# Patient Record
Sex: MALE | Race: WHITE | HISPANIC OR LATINO | Employment: OTHER | URBAN - METROPOLITAN AREA
[De-identification: names, ages, dates, MRNs, and addresses within clinical notes are randomized per-mention and may not be internally consistent; named-entity substitution may affect disease eponyms.]

---

## 2018-12-18 ENCOUNTER — OFFICE VISIT (OUTPATIENT)
Dept: URGENT CARE | Facility: CLINIC | Age: 33
End: 2018-12-18
Payer: MEDICAID

## 2018-12-18 VITALS
BODY MASS INDEX: 34.1 KG/M2 | WEIGHT: 225 LBS | HEART RATE: 94 BPM | SYSTOLIC BLOOD PRESSURE: 138 MMHG | DIASTOLIC BLOOD PRESSURE: 74 MMHG | HEIGHT: 68 IN | TEMPERATURE: 99 F | OXYGEN SATURATION: 97 % | RESPIRATION RATE: 18 BRPM

## 2018-12-18 DIAGNOSIS — J40 BRONCHITIS: ICD-10-CM

## 2018-12-18 DIAGNOSIS — M79.10 MYALGIA: Primary | ICD-10-CM

## 2018-12-18 LAB
CTP QC/QA: YES
FLUAV AG NPH QL: NEGATIVE
FLUBV AG NPH QL: NEGATIVE

## 2018-12-18 PROCEDURE — 87804 INFLUENZA ASSAY W/OPTIC: CPT | Mod: 59,QW,S$GLB, | Performed by: FAMILY MEDICINE

## 2018-12-18 PROCEDURE — 99214 OFFICE O/P EST MOD 30 MIN: CPT | Mod: 25,S$GLB,, | Performed by: FAMILY MEDICINE

## 2018-12-18 RX ORDER — DEXAMETHASONE SODIUM PHOSPHATE 100 MG/10ML
10 INJECTION INTRAMUSCULAR; INTRAVENOUS
Status: COMPLETED | OUTPATIENT
Start: 2018-12-18 | End: 2018-12-18

## 2018-12-18 RX ORDER — AZITHROMYCIN 250 MG/1
TABLET, FILM COATED ORAL
Qty: 6 TABLET | Refills: 0 | Status: SHIPPED | OUTPATIENT
Start: 2018-12-18

## 2018-12-18 RX ADMIN — DEXAMETHASONE SODIUM PHOSPHATE 10 MG: 100 INJECTION INTRAMUSCULAR; INTRAVENOUS at 04:12

## 2018-12-18 NOTE — PATIENT INSTRUCTIONS
What Is Acute Bronchitis?  Acute bronchitis is when the airways in your lungs (bronchial tubes) become red and swollen (inflamed). It is usually caused by a viral infection. But it can also occur because of a bacteria or allergen. Symptoms include a cough that produces yellow or greenish mucus and can last for days or sometimes weeks.  Inside healthy lungs    Air travels in and out of the lungs through the airways. The linings of these airways produce sticky mucus. This mucus traps particles that enter the lungs. Tiny structures called cilia then sweep the particles out of the airways.     Healthy airway: Airways are normally open. Air moves in and out easily.      Healthy cilia: Tiny, hairlike cilia sweep mucus and particles up and out of the airways.   Lungs with bronchitis  Bronchitis often occurs with a cold or the flu virus. The airways become inflamed (red and swollen). There is a deep hacking cough from the extra mucus. Other symptoms may include:  · Wheezing  · Chest discomfort  · Shortness of breath  · Mild fever  A second infection, this time due to bacteria, may then occur. And airways irritated by allergens or smoke are more likely to get infected.        Inflamed airway: Inflammation and extra mucus narrow the airway, causing shortness of breath.      Impaired cilia: Extra mucus impairs cilia, causing congestion and wheezing. Smoking makes the problem worse.   Making a diagnosis  A physical exam, health history, and certain tests help your healthcare provider make the diagnosis.  Health history  Your healthcare provider will ask you about your symptoms.  The exam  Your provider listens to your chest for signs of congestion. He or she may also check your ears, nose, and throat.  Possible tests  · A sputum test for bacteria. This requires a sample of mucus from your lungs.  · A nasal or throat swab. This tests to see if you have a bacterial infection.  · A chest X-ray. This is done if your healthcare  provider thinks you have pneumonia.  · Tests to check for an underlying condition. Other tests may be done to check for things such as allergies, asthma, or COPD (chronic obstructive pulmonary disease). You may need to see a specialist for more lung function testing.  Treating a cough  The main treatment for bronchitis is easing symptoms. Avoiding smoke, allergens, and other things that trigger coughing can often help. If the infection is bacterial, you may be given antibiotics. During the illness, it's important to get plenty of sleep. To ease symptoms:  · Dont smoke. Also avoid secondhand smoke.  · Use a humidifier. Or try breathing in steam from a hot shower. This may help loosen mucus.  · Drink a lot of water and juice. They can soothe the throat and may help thin mucus.  · Sit up or use extra pillows when in bed. This helps to lessen coughing and congestion.  · Ask your provider about using medicine. Ask about using cough medicine, pain and fever medicine, or a decongestant.  Antibiotics  Most cases of bronchitis are caused by cold or flu viruses. They dont need antibiotics to treat them, even if your mucus is thick and green or yellow. Antibiotics dont treat viral illness and antibiotics have not been shown to have any benefit in cases of acute bronchitis. Taking antibiotics when they are not needed increases your risk of getting an infection later that is antibiotic-resistant. Antibiotics can also cause severe cases of diarrhea that require other antibiotics to treat.  It is important that you accept your healthcare provider's opinion to not use antibiotics. Your provider will prescribe antibiotics if the infection is caused by bacteria. If they are prescribed:  · Take all of the medicine. Take the medicine until it is used up, even if symptoms have improved. If you dont, the bronchitis may come back.  · Take the medicines as directed. For instance, some medicines should be taken with food.  · Ask about  side effects. Ask your provider or pharmacist what side effects are common, and what to do about them.  Follow-up care  You should see your provider again in 2 to 3 weeks. By this time, symptoms should have improved. An infection that lasts longer may mean you have a more serious problem.  Prevention  · Avoid tobacco smoke. If you smoke, quit. Stay away from smoky places. Ask friends and family not to smoke around you, or in your home or car.  · Get checked for allergies.  · Ask your provider about getting a yearly flu shot. Also ask about pneumococcal or pneumonia shots.  · Wash your hands often. This helps reduce the chance of picking up viruses that cause colds and flu.  Call your healthcare provider if:  · Symptoms worsen, or you have new symptoms  · Breathing problems worsen or  become severe  · Symptoms dont get better within a week, or within 3 days of taking antibiotics   Date Last Reviewed: 2/1/2017  © 9537-7726 [employer]. [address] [place] [address], [place] [address]. All rights reserved. This information is not intended as a substitute for professional medical care. Always follow your healthcare professional's instructions.

## 2018-12-18 NOTE — PROGRESS NOTES
"Subjective:       Patient ID: Yair Flynn is a 33 y.o. male.    Vitals:  height is 5' 8" (1.727 m) and weight is 102.1 kg (225 lb). His oral temperature is 99 °F (37.2 °C). His blood pressure is 138/74 and his pulse is 94. His respiration is 18 and oxygen saturation is 97%.     Chief Complaint: Cough    Cough for 1 week.no fever or chills,  No hx of asthma.Pt states had fever and chills and achyness fever upto 103 last week, did not seek attention, all family members were ill      Cough   This is a new problem. The current episode started in the past 7 days. The problem has been unchanged. The cough is productive of sputum. Associated symptoms include chills, myalgias, nasal congestion and postnasal drip. Pertinent negatives include no ear pain, eye redness, fever, hemoptysis, rash, sore throat, shortness of breath or wheezing. Nothing aggravates the symptoms. He has tried OTC cough suppressant for the symptoms. The treatment provided mild relief.       Constitution: Positive for chills. Negative for sweating, fatigue and fever.   HENT: Positive for postnasal drip, sinus pain and sinus pressure. Negative for ear pain, congestion, sore throat and voice change.    Neck: Negative for painful lymph nodes.   Eyes: Negative for eye redness.   Respiratory: Positive for cough. Negative for chest tightness, sputum production, bloody sputum, COPD, shortness of breath, stridor, wheezing and asthma.    Gastrointestinal: Negative for nausea and vomiting.   Musculoskeletal: Positive for muscle ache.   Skin: Negative for rash.   Allergic/Immunologic: Negative for seasonal allergies and asthma.   Hematologic/Lymphatic: Negative for swollen lymph nodes.       Objective:      Physical Exam   Constitutional: He is oriented to person, place, and time. He appears well-developed and well-nourished. He is cooperative.  Non-toxic appearance. He does not appear ill. No distress.   HENT:   Head: Normocephalic and atraumatic.   Right Ear: " Hearing, tympanic membrane, external ear and ear canal normal.   Left Ear: Hearing, tympanic membrane, external ear and ear canal normal.   Nose: Nose normal. No mucosal edema, rhinorrhea or nasal deformity. No epistaxis. Right sinus exhibits no maxillary sinus tenderness and no frontal sinus tenderness. Left sinus exhibits no maxillary sinus tenderness and no frontal sinus tenderness.   Mouth/Throat: Uvula is midline, oropharynx is clear and moist and mucous membranes are normal. No trismus in the jaw. Normal dentition. No uvula swelling. No oropharyngeal exudate or posterior oropharyngeal erythema.   Eyes: Conjunctivae and lids are normal. Right eye exhibits no discharge. Left eye exhibits no discharge. No scleral icterus.   Sclera clear bilat   Neck: Trachea normal, normal range of motion, full passive range of motion without pain and phonation normal. Neck supple. No JVD present.   Cardiovascular: Normal rate, regular rhythm, normal heart sounds, intact distal pulses and normal pulses. Exam reveals no gallop and no friction rub.   No murmur heard.  Pulmonary/Chest: Effort normal and breath sounds normal. No stridor. He has no wheezes.   Minimal rhonchi, no crackels   Abdominal: Soft. Normal appearance and bowel sounds are normal. He exhibits no distension, no pulsatile midline mass and no mass. There is no tenderness.   Musculoskeletal: Normal range of motion. He exhibits no edema or deformity.   Lymphadenopathy:     He has no cervical adenopathy.   Neurological: He is alert and oriented to person, place, and time. He exhibits normal muscle tone. Coordination normal.   Skin: Skin is warm, dry and intact. He is not diaphoretic. No pallor.   Psychiatric: He has a normal mood and affect. His speech is normal and behavior is normal. Judgment and thought content normal. Cognition and memory are normal.   Nursing note and vitals reviewed.      Assessment:       1. Myalgia    2. Bronchitis        Plan:          Myalgia  -     POCT Influenza A/B  -     dexamethasone injection 10 mg    Bronchitis  -     azithromycin (ZITHROMAX Z-ROSEMARY) 250 MG tablet; Take 2 tablets (500 mg) on  Day 1,  followed by 1 tablet (250 mg) once daily on Days 2 through 5.  Dispense: 6 tablet; Refill: 0  -     dexamethasone injection 10 mg

## 2018-12-21 ENCOUNTER — OFFICE VISIT (OUTPATIENT)
Dept: URGENT CARE | Facility: CLINIC | Age: 33
End: 2018-12-21
Payer: MEDICAID

## 2018-12-21 VITALS
WEIGHT: 225 LBS | OXYGEN SATURATION: 98 % | HEART RATE: 79 BPM | RESPIRATION RATE: 18 BRPM | SYSTOLIC BLOOD PRESSURE: 114 MMHG | HEIGHT: 68 IN | DIASTOLIC BLOOD PRESSURE: 62 MMHG | TEMPERATURE: 98 F | BODY MASS INDEX: 34.1 KG/M2

## 2018-12-21 DIAGNOSIS — R20.2 INTERMITTENT PARESTHESIA OF RIGHT HAND AND FOOT: ICD-10-CM

## 2018-12-21 DIAGNOSIS — J40 BRONCHITIS: Primary | ICD-10-CM

## 2018-12-21 PROCEDURE — 99214 OFFICE O/P EST MOD 30 MIN: CPT | Mod: S$GLB,,, | Performed by: PHYSICIAN ASSISTANT

## 2018-12-21 PROCEDURE — 71046 X-RAY EXAM CHEST 2 VIEWS: CPT | Mod: FY,S$GLB,, | Performed by: RADIOLOGY

## 2018-12-21 RX ORDER — PROMETHAZINE HYDROCHLORIDE AND DEXTROMETHORPHAN HYDROBROMIDE 6.25; 15 MG/5ML; MG/5ML
5 SYRUP ORAL NIGHTLY PRN
Qty: 60 ML | Refills: 0 | Status: SHIPPED | OUTPATIENT
Start: 2018-12-21

## 2018-12-21 RX ORDER — BENZONATATE 100 MG/1
100 CAPSULE ORAL 3 TIMES DAILY PRN
Qty: 30 CAPSULE | Refills: 0 | Status: SHIPPED | OUTPATIENT
Start: 2018-12-21 | End: 2019-12-21

## 2018-12-21 RX ORDER — DOXYCYCLINE 100 MG/1
100 CAPSULE ORAL 2 TIMES DAILY
Qty: 14 CAPSULE | Refills: 0 | Status: SHIPPED | OUTPATIENT
Start: 2018-12-21 | End: 2018-12-28

## 2018-12-22 NOTE — PROGRESS NOTES
"Subjective:       Patient ID: Yair Flynn is a 33 y.o. male.    Vitals:  height is 5' 8" (1.727 m) and weight is 102.1 kg (225 lb). His temperature is 98.4 °F (36.9 °C). His blood pressure is 114/62 and his pulse is 79. His respiration is 18 and oxygen saturation is 98%.     Chief Complaint: Cough    Pt complains of ear pain and unable to hear at all from his left ear and cough for the past 4 days. He also complains of a tingling sensation in his right hand and right foot for the past day. He denies numbness. He denies weakness.       Cough   This is a recurrent problem. The current episode started in the past 7 days. The problem has been unchanged. The problem occurs constantly. The cough is productive of sputum. Associated symptoms include ear pain. Pertinent negatives include no chills, eye redness, fever, hemoptysis, myalgias, rash, sore throat, shortness of breath or wheezing. Nothing aggravates the symptoms. He has tried OTC cough suppressant (antibotics) for the symptoms. The treatment provided mild relief.       Constitution: Negative for chills, sweating, fatigue and fever.   HENT: Positive for ear pain, congestion, sinus pain and sinus pressure. Negative for sore throat and voice change.    Neck: Negative for painful lymph nodes.   Eyes: Negative for eye redness.   Respiratory: Positive for cough. Negative for chest tightness, sputum production, bloody sputum, COPD, shortness of breath, stridor, wheezing and asthma.    Gastrointestinal: Negative for nausea and vomiting.   Musculoskeletal: Negative for muscle ache.   Skin: Negative for rash and erythema.   Allergic/Immunologic: Negative for seasonal allergies and asthma.   Hematologic/Lymphatic: Negative for swollen lymph nodes.       Objective:      Physical Exam   Constitutional: He is oriented to person, place, and time. Vital signs are normal. He appears well-developed and well-nourished. He is cooperative. He does not appear ill. No distress.   HENT: "   Head: Normocephalic and atraumatic.   Right Ear: Hearing, tympanic membrane, external ear and ear canal normal.   Left Ear: Hearing, tympanic membrane, external ear and ear canal normal.   Nose: Mucosal edema present. Right sinus exhibits no maxillary sinus tenderness and no frontal sinus tenderness. Left sinus exhibits no maxillary sinus tenderness and no frontal sinus tenderness.   Mouth/Throat: Uvula is midline and oropharynx is clear and moist. No oropharyngeal exudate, posterior oropharyngeal edema or posterior oropharyngeal erythema.   Eyes: Conjunctivae, EOM and lids are normal. Right eye exhibits no discharge. Left eye exhibits no discharge.   Neck: Normal range of motion. Neck supple.   Cardiovascular: Normal rate, regular rhythm and normal heart sounds. Exam reveals no gallop and no friction rub.   No murmur heard.  Pulmonary/Chest: Effort normal. No stridor. No respiratory distress. He has no decreased breath sounds. He has wheezes (diffuse). He has no rhonchi. He has no rales.   Musculoskeletal: Normal range of motion.   Lymphadenopathy:        Head (right side): No submandibular and no tonsillar adenopathy present.        Head (left side): No submandibular and no tonsillar adenopathy present.   Neurological: He is alert and oriented to person, place, and time. He has normal strength. He is not disoriented. No sensory deficit. He displays a negative Romberg sign. GCS eye subscore is 4. GCS verbal subscore is 5. GCS motor subscore is 6.   Symmetrical facial expressions; normal finger to nose test; normal heel to shin test; normal rapid alternating hand movements   Skin: Skin is warm and dry. No bruising and no rash noted. He is not diaphoretic. No erythema. No pallor.   Psychiatric: He has a normal mood and affect. His speech is normal and behavior is normal.   Nursing note and vitals reviewed.      Assessment:       1. Bronchitis    2. Intermittent paresthesia of right hand and foot        Xr Chest Pa  And Lateral    Result Date: 12/21/2018  EXAMINATION: XR CHEST PA AND LATERAL CLINICAL HISTORY: Cough TECHNIQUE: PA and lateral views of the chest were performed. COMPARISON: None FINDINGS: Cardiac silhouette is normal in size.  Lungs are symmetrically expanded.  No evidence of focal consolidative process, pneumothorax, or significant effusion.  No acute osseous abnormality identified.     No acute intrathoracic process identified. Electronically signed by: Victorino Cortez MD Date:    12/21/2018 Time:    20:42    Plan:       Strongly advised patient to report to the emergency room if the tingling sensation worsens. Pt states he believes he may just be tired. Treating bronchitis. Pt shows no signs of stroke. Vitals stable. No significant medical history.     Bronchitis  -     XR CHEST PA AND LATERAL; Future; Expected date: 12/21/2018  -     doxycycline (VIBRAMYCIN) 100 MG Cap; Take 1 capsule (100 mg total) by mouth 2 (two) times daily. for 7 days  Dispense: 14 capsule; Refill: 0  -     benzonatate (TESSALON PERLES) 100 MG capsule; Take 1 capsule (100 mg total) by mouth 3 (three) times daily as needed for Cough.  Dispense: 30 capsule; Refill: 0  -     promethazine-dextromethorphan (PROMETHAZINE-DM) 6.25-15 mg/5 mL Syrp; Take 5 mLs by mouth nightly as needed.  Dispense: 60 mL; Refill: 0    Intermittent paresthesia of right hand and foot      Patient Instructions   PLEASE REPORT DIRECTLY TO THE EMERGENCY ROOM IF NUMBNESS/TINGLING SENSATIONS DO NOT IMPROVE OR WORSEN FOR FURTHER EVALUATION AS DISCUSSED.    -Please take antibiotic to completion.  -Take tessalon perles during the day and cough syrup at night as needed. Be aware the cough syrup may cause drowsiness.    Below are suggestions for symptomatic relief:   -Tylenol every 4 hours OR ibuprofen every 6 hours as needed for pain/fever.   -Salt water gargles to soothe throat pain.   -Chloroseptic spray also helps to numb throat pain.   -Nasal saline spray reduces  inflammation and dryness.   -Warm face compresses to help with facial sinus pain/pressure.   -Vicks vapor rub at night.   -Flonase OTC or Nasacort OTC for nasal congestion.   -Simple foods like chicken noodle soup.   -Delsym helps with coughing at night   -Zyrtec/Claritin during the day & Benadryl at night may help with allergies.     If you DO NOT have Hypertension or any history of palpitations, it is ok to take over the counter Sudafed or Mucinex D or Allegra-D or Claritin-D or Zyrtec-D.  If you do take one of the above, it is ok to combine that with plain over the counter Mucinex or Allegra or Claritin or Zyrtec. If, for example, you are taking Zyrtec -D, you can combine that with Mucinex, but not Mucinex-D.  If you are taking Mucinex-D, you can combine that with plain Allegra or Claritin or Zyrtec.   If you DO have Hypertension or palpitations, it is safe to take Coricidin HBP for relief of sinus symptoms.    Please follow up with your primary care provider within 2-5 days if your signs and symptoms have not resolved or worsen.     If your condition worsens or fails to improve we recommend that you receive another evaluation at the emergency room immediately or contact your primary medical clinic to discuss your concerns.   You must understand that you have received an Urgent Care treatment only and that you may be released before all of your medical problems are known or treated. You, the patient, will arrange for follow up care as instructed.

## 2018-12-22 NOTE — PATIENT INSTRUCTIONS
PLEASE REPORT DIRECTLY TO THE EMERGENCY ROOM IF NUMBNESS/TINGLING SENSATIONS DO NOT IMPROVE OR WORSEN FOR FURTHER EVALUATION AS DISCUSSED.    -Please take antibiotic to completion.  -Take tessalon perles during the day and cough syrup at night as needed. Be aware the cough syrup may cause drowsiness.    Below are suggestions for symptomatic relief:   -Tylenol every 4 hours OR ibuprofen every 6 hours as needed for pain/fever.   -Salt water gargles to soothe throat pain.   -Chloroseptic spray also helps to numb throat pain.   -Nasal saline spray reduces inflammation and dryness.   -Warm face compresses to help with facial sinus pain/pressure.   -Vicks vapor rub at night.   -Flonase OTC or Nasacort OTC for nasal congestion.   -Simple foods like chicken noodle soup.   -Delsym helps with coughing at night   -Zyrtec/Claritin during the day & Benadryl at night may help with allergies.     If you DO NOT have Hypertension or any history of palpitations, it is ok to take over the counter Sudafed or Mucinex D or Allegra-D or Claritin-D or Zyrtec-D.  If you do take one of the above, it is ok to combine that with plain over the counter Mucinex or Allegra or Claritin or Zyrtec. If, for example, you are taking Zyrtec -D, you can combine that with Mucinex, but not Mucinex-D.  If you are taking Mucinex-D, you can combine that with plain Allegra or Claritin or Zyrtec.   If you DO have Hypertension or palpitations, it is safe to take Coricidin HBP for relief of sinus symptoms.    Please follow up with your primary care provider within 2-5 days if your signs and symptoms have not resolved or worsen.     If your condition worsens or fails to improve we recommend that you receive another evaluation at the emergency room immediately or contact your primary medical clinic to discuss your concerns.   You must understand that you have received an Urgent Care treatment only and that you may be released before all of your medical problems are  known or treated. You, the patient, will arrange for follow up care as instructed.

## 2020-09-29 ENCOUNTER — OFFICE VISIT (OUTPATIENT)
Dept: URGENT CARE | Facility: CLINIC | Age: 35
End: 2020-09-29
Payer: MEDICAID

## 2020-09-29 VITALS
HEART RATE: 67 BPM | SYSTOLIC BLOOD PRESSURE: 105 MMHG | BODY MASS INDEX: 31.83 KG/M2 | OXYGEN SATURATION: 98 % | DIASTOLIC BLOOD PRESSURE: 73 MMHG | WEIGHT: 210 LBS | HEIGHT: 68 IN | TEMPERATURE: 99 F

## 2020-09-29 DIAGNOSIS — S39.012A LOW BACK STRAIN, INITIAL ENCOUNTER: Primary | ICD-10-CM

## 2020-09-29 DIAGNOSIS — M62.830 BACK MUSCLE SPASM: ICD-10-CM

## 2020-09-29 PROCEDURE — 99213 OFFICE O/P EST LOW 20 MIN: CPT | Mod: S$GLB,,, | Performed by: PHYSICIAN ASSISTANT

## 2020-09-29 PROCEDURE — 99213 PR OFFICE/OUTPT VISIT, EST, LEVL III, 20-29 MIN: ICD-10-PCS | Mod: S$GLB,,, | Performed by: PHYSICIAN ASSISTANT

## 2020-09-29 RX ORDER — KETOROLAC TROMETHAMINE 30 MG/ML
60 INJECTION, SOLUTION INTRAMUSCULAR; INTRAVENOUS
Status: DISCONTINUED | OUTPATIENT
Start: 2020-09-29 | End: 2020-09-29

## 2020-09-29 RX ORDER — CYCLOBENZAPRINE HCL 5 MG
5 TABLET ORAL 3 TIMES DAILY PRN
Qty: 21 TABLET | Refills: 0 | Status: SHIPPED | OUTPATIENT
Start: 2020-09-29 | End: 2020-10-06

## 2020-09-29 RX ORDER — KETOROLAC TROMETHAMINE 30 MG/ML
30 INJECTION, SOLUTION INTRAMUSCULAR; INTRAVENOUS
Status: COMPLETED | OUTPATIENT
Start: 2020-09-29 | End: 2020-09-29

## 2020-09-29 RX ORDER — DICLOFENAC SODIUM 50 MG/1
50 TABLET, DELAYED RELEASE ORAL 2 TIMES DAILY PRN
Qty: 14 TABLET | Refills: 0 | Status: SHIPPED | OUTPATIENT
Start: 2020-09-29 | End: 2020-10-06

## 2020-09-29 RX ADMIN — KETOROLAC TROMETHAMINE 30 MG: 30 INJECTION, SOLUTION INTRAMUSCULAR; INTRAVENOUS at 08:09

## 2020-09-29 NOTE — LETTER
September 29, 2020      [facility] [place] [address]  Phone: [phone]  Fax: [phone]       Patient: Yair Flynn   YOB: 1985  Date of Visit: 09/29/2020    To Whom It May Concern:    Ladi Flynn  was at [facility] on 09/29/2020. He may return to work/school on 10/01/2020 with no restrictions. If you have any questions or concerns, or if I can be of further assistance, please do not hesitate to contact me.    Sincerely,    Dariela Jackson MA

## 2020-09-30 NOTE — PATIENT INSTRUCTIONS
PLEASE READ YOUR DISCHARGE INSTRUCTIONS ENTIRELY AS IT CONTAINS IMPORTANT INFORMATION.  You received an injection of a powerful NSAID today (Toradol).  Its effects will last up to 24 hours.  Please do not take another NSAID (ie aspirin, ibuprofen, Aleve, Advil or Motrin) until this time tomorrow.  If you continue to have pain, you may take Tylenol (acetaminophen) if you are not allergic to this medication.  Do not start the Voltaren until 10pm tomorrow.   - Rest.    - Drink plenty of fluids.    - Tylenol as directed as needed for fever/pain.    - If you were prescribed antibiotics, please take them to completion.  - If you are female and on birth control pills - please use additional methods of contraception to prevent pregnancy while on antibiotics and for one cycle after.   - If you were prescribed a narcotic medication or muscle relaxer, do not drive or operate heavy equipment or machinery while taking these medications, as they can cause drowsiness.   - If you smoke, please stop smoking.  -You must understand that you've received an Urgent Care treatment only and that you may be released before all your medical problems are known or treated. You, the patient, will    arrange for follow up care as instructed. Please arrange follow up with your primary medical clinic as soon as possible.   - Follow up with your PCP or specialty clinic as directed in the next 1-2 weeks if not improved or as needed.  You can call [phone] to schedule an appointment with the appropriate provider.    - Please return to Urgent Care or to the Emergency Department if your symptoms worsen.    Patient aware and verbalized understanding.    Back Sprain or Strain    Injury to the muscles (strain) or ligaments (sprain) around the spine can be troubling. Injury may occur after a sudden forceful twisting or bending force such as in a car accident, after a simple awkward movement, or after lifting something heavy with poor body  positioning. In any case, muscle spasm is often present and adds to the pain.  Thankfully, most people feel better in 1 to 2 weeks, and most of the rest in 1 to 2 months. Most people can remain active. Unless you had a forceful or traumatic physical injury such as a car accident or fall, X-rays may not be ordered for the first evaluation of a back sprain or strain. If pain continues and does not respond to medical treatment, your healthcare provider may then order X-rays and other tests.  Home care  The following guidelines will help you care for your injury at home:  · When in bed, try to find a comfortable position. A firm mattress is best. Try lying flat on your back with pillows under your knees. You can also try lying on your side with your knees bent up toward your chest and a pillow between your knees.  · Don't sit for long periods. Try not to take long car rides or take other trips that have you sitting for a long time. This puts more stress on the lower back than standing or walking.  · During the first 24 to 72 hours after an injury or flare-up, apply an ice pack to the painful area for 20 minutes. Then remove it for 20 minutes. Do this for 60 to 90 minutes, or several times a day. This will reduce swelling and pain. Be sure to wrap the ice pack in a thin towel or plastic to protect your skin.  · You can start with ice, then switch to heat. Heat from a hot shower, hot bath, or heating pad reduces pain and works well for muscle spasms. Put heat on the painful area for 20 minutes, then remove for 20 minutes. Do this for 60 to 90 minutes, or several times a day. Do not use a heating pad while sleeping. It can burn the skin.  · You can alternate the ice and heat. Talk with your healthcare provider to find out the best treatment or therapy for your back pain.  · Therapeutic massage will help relax the back muscles without stretching them.  · Be aware of safe lifting methods. Do not lift anything over 15 pounds  until all of the pain is gone.  Medicines  Talk to your healthcare provider before using medicines, especially if you have other health problems or are taking other medicines.  · You may use acetaminophen or ibuprofen to control pain, unless another pain medicine was prescribed. If you have chronic conditions like diabetes, liver or kidney disease, stomach ulcers, or gastrointestinal bleeding, or are taking blood-thinner medicines, talk with your doctor before taking any medicines.  · Be careful if you are given prescription medicines, narcotics, or medicine for muscle spasm. They can cause drowsiness, and affect your coordination, reflexes, and judgment. Do not drive or operate heavy machinery when taking these types of medicines. Only take pain medicine as prescribed by your healthcare provider.  Follow-up care  Follow up with your healthcare provider, or as advised. You may need physical therapy or more tests if your symptoms get worse.  If you had X-rays your healthcare provider may be checking for any broken bones, breaks, or fractures. Bruises and sprains can sometimes hurt as much as a fracture. These injuries can take time to heal completely. If your symptoms dont improve or they get worse, talk with your healthcare provider. You may need a repeat X-ray or other tests.  Call 911  Call for emergency care if any of the following occur:  · Trouble breathing  · Confused  · Very drowsy or trouble awakening  · Fainting or loss of consciousness  · Rapid or very slow heart rate  · Loss of bowel or bladder control  When to seek medical advice  Call your healthcare provider right away if any of the following occur:  · Pain gets worse or spreads to your arms or legs  · Weakness or numbness in one or both arms or legs  · Numbness in the groin or genital area  Date Last Reviewed: 6/1/2016 © 2000-2017 [employer]. [address] [place] [id number]. All rights reserved. This information is not  intended as a substitute for professional medical care. Always follow your healthcare professional's instructions.

## 2020-09-30 NOTE — PROGRESS NOTES
"Subjective:       Patient ID: Yair Flynn is a 35 y.o. male.    Vitals:  height is 5' 8" (1.727 m) and weight is 95.3 kg (210 lb). His temperature is 98.6 °F (37 °C). His blood pressure is 105/73 and his pulse is 67. His oxygen saturation is 98%.     Chief Complaint: Back Pain    Mr. Flynn presents for evaluation of left low back pain and spasm.  He was bending over to  something floor this morning and felt a sharp pain in his left lower back.  He denies any radiating leg pain, paresthesias, weakness, saddle anesthesia or B/B dysfunction.  He has taken ibuprofen 800mg for symptoms with some relief.         Back Pain  This is a new problem. The current episode started today. The problem occurs constantly. The problem is unchanged. The quality of the pain is described as shooting and stabbing. The pain does not radiate. The pain is at a severity of 10/10. The pain is severe. The pain is the same all the time. The symptoms are aggravated by bending, position, sitting and standing. Pertinent negatives include no abdominal pain, bladder incontinence, bowel incontinence, chest pain, dysuria, fever, headaches or numbness. He has tried nothing for the symptoms. The treatment provided no relief.       Constitution: Negative for chills, sweating, fatigue and fever.   Cardiovascular: Negative for chest trauma, chest pain, leg swelling, palpitations, sob on exertion and passing out.   Respiratory: Negative for cough and shortness of breath.    Gastrointestinal: Negative for abdominal pain, nausea, vomiting, constipation and bowel incontinence.   Genitourinary: Negative for dysuria, urgency, bladder incontinence and hematuria.   Musculoskeletal: Positive for pain, back pain and pain with walking. Negative for trauma, joint pain, joint swelling, muscle cramps and history of spine disorder.   Skin: Negative for rash.   Neurological: Negative for dizziness, history of vertigo, light-headedness, passing out, facial drooping, " speech difficulty, coordination disturbances, loss of balance, headaches, numbness and tingling.       Objective:      Physical Exam   Constitutional: He is oriented to person, place, and time. He appears well-developed. He is cooperative. No distress.   HENT:   Head: Normocephalic and atraumatic.   Nose: Nose normal.   Mouth/Throat: Oropharynx is clear and moist and mucous membranes are normal.   Eyes: Conjunctivae and lids are normal.   Neck: Trachea normal, normal range of motion, full passive range of motion without pain and phonation normal. Neck supple.   Cardiovascular: Normal rate, regular rhythm, normal heart sounds and normal pulses.   Pulmonary/Chest: Effort normal and breath sounds normal.   Abdominal: Soft. Normal appearance and bowel sounds are normal. He exhibits no abdominal bruit, no pulsatile midline mass and no mass.   Musculoskeletal:         General: No deformity.      Lumbar back: He exhibits tenderness, pain and spasm. He exhibits normal range of motion, no bony tenderness, no swelling, no edema, no deformity, no laceration and normal pulse.        Back:       Comments: TTP left lower back, no midline L spine TTP.   Neurological: He is alert and oriented to person, place, and time. He has normal strength and normal reflexes. No sensory deficit.      Comments: BLE 5/5 HF, KF, KE, DF, PF, EHL.  Sensation intact.      Skin: Skin is warm, dry, intact and not diaphoretic. Psychiatric: His speech is normal and behavior is normal. Judgment and thought content normal.   Nursing note and vitals reviewed.        Assessment:       1. Low back strain, initial encounter    2. Back muscle spasm        Plan:         Low back strain, initial encounter    Back muscle spasm    Other orders  -     diclofenac (VOLTAREN) 50 MG EC tablet; Take 1 tablet (50 mg total) by mouth 2 (two) times daily as needed.  Dispense: 14 tablet; Refill: 0  -     cyclobenzaprine (FLEXERIL) 5 MG tablet; Take 1 tablet (5 mg total) by  mouth 3 (three) times daily as needed.  Dispense: 21 tablet; Refill: 0  -     ketorolac injection 30 mg    Diagnoses and plan discussed with the patient, as well as the expected course and duration of his symptoms.  All questions and concerns were addressed prior to discharge.  He was advised to follow up with his PCP within 1 week if symptoms do not improve.  Emergency department precautions were given.  Patient verbalized understanding and was happy with the plan of care.     Patient Instructions   PLEASE READ YOUR DISCHARGE INSTRUCTIONS ENTIRELY AS IT CONTAINS IMPORTANT INFORMATION.  You received an injection of a powerful NSAID today (Toradol).  Its effects will last up to 24 hours.  Please do not take another NSAID (ie aspirin, ibuprofen, Aleve, Advil or Motrin) until this time tomorrow.  If you continue to have pain, you may take Tylenol (acetaminophen) if you are not allergic to this medication.  Do not start the Voltaren until 10pm tomorrow.   - Rest.    - Drink plenty of fluids.    - Tylenol as directed as needed for fever/pain.    - If you were prescribed antibiotics, please take them to completion.  - If you are female and on birth control pills - please use additional methods of contraception to prevent pregnancy while on antibiotics and for one cycle after.   - If you were prescribed a narcotic medication or muscle relaxer, do not drive or operate heavy equipment or machinery while taking these medications, as they can cause drowsiness.   - If you smoke, please stop smoking.  -You must understand that you've received an Urgent Care treatment only and that you may be released before all your medical problems are known or treated. You, the patient, will    arrange for follow up care as instructed. Please arrange follow up with your primary medical clinic as soon as possible.   - Follow up with your PCP or specialty clinic as directed in the next 1-2 weeks if not improved or as needed.  You can call [phone] to schedule an appointment with the appropriate provider.    - Please return to Urgent Care or to the Emergency Department if your symptoms worsen.    Patient aware and verbalized understanding.    Back Sprain or Strain    Injury to the muscles (strain) or ligaments (sprain) around the spine can be troubling. Injury may occur after a sudden forceful twisting or bending force such as in a car accident, after a simple awkward movement, or after lifting something heavy with poor body positioning. In any case, muscle spasm is often present and adds to the pain.  Thankfully, most people feel better in 1 to 2 weeks, and most of the rest in 1 to 2 months. Most people can remain active. Unless you had a forceful or traumatic physical injury such as a car accident or fall, X-rays may not be ordered for the first evaluation of a back sprain or strain. If pain continues and does not respond to medical treatment, your healthcare provider may then order X-rays and other tests.  Home care  The following guidelines will help you care for your injury at home:  · When in bed, try to find a comfortable position. A firm mattress is best. Try lying flat on your back with pillows under your knees. You can also try lying on your side with your knees bent up toward your chest and a pillow between your knees.  · Don't sit for long periods. Try not to take long car rides or take other trips that have you sitting for a long time. This puts more stress on the lower back than standing or walking.  · During the first 24 to 72 hours after an injury or flare-up, apply an ice pack to the painful area for 20 minutes. Then remove it for 20 minutes. Do this for 60 to 90 minutes, or several times a day. This will reduce swelling and pain. Be sure to wrap the ice pack in a thin towel or plastic to protect your skin.  · You can start with ice, then switch to heat. Heat from a hot shower, hot bath, or heating pad reduces pain and works well for  muscle spasms. Put heat on the painful area for 20 minutes, then remove for 20 minutes. Do this for 60 to 90 minutes, or several times a day. Do not use a heating pad while sleeping. It can burn the skin.  · You can alternate the ice and heat. Talk with your healthcare provider to find out the best treatment or therapy for your back pain.  · Therapeutic massage will help relax the back muscles without stretching them.  · Be aware of safe lifting methods. Do not lift anything over 15 pounds until all of the pain is gone.  Medicines  Talk to your healthcare provider before using medicines, especially if you have other health problems or are taking other medicines.  · You may use acetaminophen or ibuprofen to control pain, unless another pain medicine was prescribed. If you have chronic conditions like diabetes, liver or kidney disease, stomach ulcers, or gastrointestinal bleeding, or are taking blood-thinner medicines, talk with your doctor before taking any medicines.  · Be careful if you are given prescription medicines, narcotics, or medicine for muscle spasm. They can cause drowsiness, and affect your coordination, reflexes, and judgment. Do not drive or operate heavy machinery when taking these types of medicines. Only take pain medicine as prescribed by your healthcare provider.  Follow-up care  Follow up with your healthcare provider, or as advised. You may need physical therapy or more tests if your symptoms get worse.  If you had X-rays your healthcare provider may be checking for any broken bones, breaks, or fractures. Bruises and sprains can sometimes hurt as much as a fracture. These injuries can take time to heal completely. If your symptoms dont improve or they get worse, talk with your healthcare provider. You may need a repeat X-ray or other tests.  Call 911  Call for emergency care if any of the following occur:  · Trouble breathing  · Confused  · Very drowsy or trouble awakening  · Fainting or loss  of consciousness  · Rapid or very slow heart rate  · Loss of bowel or bladder control  When to seek medical advice  Call your healthcare provider right away if any of the following occur:  · Pain gets worse or spreads to your arms or legs  · Weakness or numbness in one or both arms or legs  · Numbness in the groin or genital area  Date Last Reviewed: 6/1/2016  © 2132-5800 [employer]. [address] [place] [address], [place] [address]. All rights reserved. This information is not intended as a substitute for professional medical care. Always follow your healthcare professional's instructions.

## 2021-09-16 ENCOUNTER — IMMUNIZATION (OUTPATIENT)
Dept: INTERNAL MEDICINE | Facility: CLINIC | Age: 36
End: 2021-09-16
Payer: MEDICAID

## 2021-09-16 DIAGNOSIS — Z23 NEED FOR VACCINATION: Primary | ICD-10-CM

## 2021-09-16 PROCEDURE — 91300 COVID-19, MRNA, LNP-S, PF, 30 MCG/0.3 ML DOSE VACCINE: ICD-10-PCS | Mod: ,,, | Performed by: INTERNAL MEDICINE

## 2021-09-16 PROCEDURE — 91300 COVID-19, MRNA, LNP-S, PF, 30 MCG/0.3 ML DOSE VACCINE: CPT | Mod: ,,, | Performed by: INTERNAL MEDICINE

## 2021-09-16 PROCEDURE — 0001A COVID-19, MRNA, LNP-S, PF, 30 MCG/0.3 ML DOSE VACCINE: ICD-10-PCS | Mod: CV19,,, | Performed by: INTERNAL MEDICINE

## 2021-09-16 PROCEDURE — 0001A COVID-19, MRNA, LNP-S, PF, 30 MCG/0.3 ML DOSE VACCINE: CPT | Mod: CV19,,, | Performed by: INTERNAL MEDICINE

## 2021-10-07 ENCOUNTER — IMMUNIZATION (OUTPATIENT)
Dept: INTERNAL MEDICINE | Facility: CLINIC | Age: 36
End: 2021-10-07
Payer: MEDICAID

## 2021-10-07 DIAGNOSIS — Z23 NEED FOR VACCINATION: Primary | ICD-10-CM

## 2021-10-07 PROCEDURE — 91300 COVID-19, MRNA, LNP-S, PF, 30 MCG/0.3 ML DOSE VACCINE: CPT | Mod: PBBFAC

## 2021-10-07 PROCEDURE — 0002A COVID-19, MRNA, LNP-S, PF, 30 MCG/0.3 ML DOSE VACCINE: CPT | Mod: PBBFAC

## 2022-04-12 ENCOUNTER — OFFICE VISIT (OUTPATIENT)
Dept: URGENT CARE | Facility: CLINIC | Age: 37
End: 2022-04-12
Payer: MEDICAID

## 2022-04-12 VITALS
HEIGHT: 68 IN | HEART RATE: 77 BPM | DIASTOLIC BLOOD PRESSURE: 81 MMHG | OXYGEN SATURATION: 98 % | TEMPERATURE: 99 F | WEIGHT: 210 LBS | SYSTOLIC BLOOD PRESSURE: 134 MMHG | RESPIRATION RATE: 18 BRPM | BODY MASS INDEX: 31.83 KG/M2

## 2022-04-12 DIAGNOSIS — H66.002 ACUTE SUPPURATIVE OTITIS MEDIA OF LEFT EAR WITHOUT SPONTANEOUS RUPTURE OF TYMPANIC MEMBRANE, RECURRENCE NOT SPECIFIED: Primary | ICD-10-CM

## 2022-04-12 DIAGNOSIS — J06.9 UPPER RESPIRATORY TRACT INFECTION, UNSPECIFIED TYPE: ICD-10-CM

## 2022-04-12 LAB
CTP QC/QA: YES
CTP QC/QA: YES
POC MOLECULAR INFLUENZA A AGN: NEGATIVE
POC MOLECULAR INFLUENZA B AGN: NEGATIVE
SARS-COV-2 RDRP RESP QL NAA+PROBE: NEGATIVE

## 2022-04-12 PROCEDURE — 3075F SYST BP GE 130 - 139MM HG: CPT | Mod: CPTII,S$GLB,, | Performed by: FAMILY MEDICINE

## 2022-04-12 PROCEDURE — 1159F PR MEDICATION LIST DOCUMENTED IN MEDICAL RECORD: ICD-10-PCS | Mod: CPTII,S$GLB,, | Performed by: FAMILY MEDICINE

## 2022-04-12 PROCEDURE — 99214 OFFICE O/P EST MOD 30 MIN: CPT | Mod: S$GLB,CS,, | Performed by: FAMILY MEDICINE

## 2022-04-12 PROCEDURE — U0002 COVID-19 LAB TEST NON-CDC: HCPCS | Mod: QW,S$GLB,, | Performed by: FAMILY MEDICINE

## 2022-04-12 PROCEDURE — 3079F PR MOST RECENT DIASTOLIC BLOOD PRESSURE 80-89 MM HG: ICD-10-PCS | Mod: CPTII,S$GLB,, | Performed by: FAMILY MEDICINE

## 2022-04-12 PROCEDURE — 1160F RVW MEDS BY RX/DR IN RCRD: CPT | Mod: CPTII,S$GLB,, | Performed by: FAMILY MEDICINE

## 2022-04-12 PROCEDURE — 3079F DIAST BP 80-89 MM HG: CPT | Mod: CPTII,S$GLB,, | Performed by: FAMILY MEDICINE

## 2022-04-12 PROCEDURE — 99214 PR OFFICE/OUTPT VISIT, EST, LEVL IV, 30-39 MIN: ICD-10-PCS | Mod: S$GLB,CS,, | Performed by: FAMILY MEDICINE

## 2022-04-12 PROCEDURE — 3008F PR BODY MASS INDEX (BMI) DOCUMENTED: ICD-10-PCS | Mod: CPTII,S$GLB,, | Performed by: FAMILY MEDICINE

## 2022-04-12 PROCEDURE — 87502 INFLUENZA DNA AMP PROBE: CPT | Mod: QW,S$GLB,, | Performed by: FAMILY MEDICINE

## 2022-04-12 PROCEDURE — 3075F PR MOST RECENT SYSTOLIC BLOOD PRESS GE 130-139MM HG: ICD-10-PCS | Mod: CPTII,S$GLB,, | Performed by: FAMILY MEDICINE

## 2022-04-12 PROCEDURE — 1159F MED LIST DOCD IN RCRD: CPT | Mod: CPTII,S$GLB,, | Performed by: FAMILY MEDICINE

## 2022-04-12 PROCEDURE — 3008F BODY MASS INDEX DOCD: CPT | Mod: CPTII,S$GLB,, | Performed by: FAMILY MEDICINE

## 2022-04-12 PROCEDURE — 87502 POCT INFLUENZA A/B MOLECULAR: ICD-10-PCS | Mod: QW,S$GLB,, | Performed by: FAMILY MEDICINE

## 2022-04-12 PROCEDURE — U0002: ICD-10-PCS | Mod: QW,S$GLB,, | Performed by: FAMILY MEDICINE

## 2022-04-12 PROCEDURE — 1160F PR REVIEW ALL MEDS BY PRESCRIBER/CLIN PHARMACIST DOCUMENTED: ICD-10-PCS | Mod: CPTII,S$GLB,, | Performed by: FAMILY MEDICINE

## 2022-04-12 RX ORDER — AMOXICILLIN AND CLAVULANATE POTASSIUM 875; 125 MG/1; MG/1
1 TABLET, FILM COATED ORAL 2 TIMES DAILY
Qty: 20 TABLET | Refills: 0 | Status: SHIPPED | OUTPATIENT
Start: 2022-04-12 | End: 2022-04-22

## 2022-04-12 RX ORDER — BENZONATATE 100 MG/1
100 CAPSULE ORAL EVERY 6 HOURS PRN
Qty: 30 CAPSULE | Refills: 1 | Status: SHIPPED | OUTPATIENT
Start: 2022-04-12 | End: 2023-04-12

## 2022-04-12 NOTE — LETTER
April 12, 2022      [facility]  [address] [address]  Phone: [phone]  Fax: [phone]       Patient: Yair Flynn   YOB: 1985  Date of Visit: 04/12/2022    To Whom It May Concern:    Ladi Flynn  was at [facility] on 04/12/2022. The patient may return to work/school on 4/14/2022 with no restrictions. If you have any questions or concerns, or if I can be of further assistance, please do not hesitate to contact me.    Sincerely,    Amira Soto MA

## 2022-04-12 NOTE — PROGRESS NOTES
"Subjective:       Patient ID: Yair Flynn is a 36 y.o. male.    Vitals:  height is 5' 8" (1.727 m) and weight is 95.3 kg (210 lb). His temperature is 98.7 °F (37.1 °C). His blood pressure is 134/81 and his pulse is 77. His respiration is 18 and oxygen saturation is 98%.     Chief Complaint: Sinus Problem    Pt reports that three days ago he developed a runny nose, cough, nasal congestion and chills. He says that this morning he developed left sided ear pain. He had a fever of 99.9F.     Sinus Problem  This is a new problem. The current episode started in the past 7 days. The problem has been gradually worsening since onset. There has been no fever. The fever has been present for 1 to 2 days. His pain is at a severity of 9/10. The pain is moderate. Associated symptoms include chills, congestion, coughing, ear pain, headaches and a sore throat. Past treatments include acetaminophen. The treatment provided no relief.       Constitution: Positive for chills.   HENT: Positive for ear pain, congestion and sore throat.    Respiratory: Positive for cough.    Neurological: Positive for headaches.       Objective:      Physical Exam   Constitutional: He does not appear ill. No distress. obesity  HENT:   Head: Normocephalic and atraumatic.   Ears:   Right Ear: Tympanic membrane and ear canal normal.   Left Ear: Ear canal normal. Tympanic membrane is injected and erythematous.   Nose: Congestion present.   Mouth/Throat: Mucous membranes are moist. Posterior oropharyngeal erythema present.   Eyes: Pupils are equal, round, and reactive to light.      extraocular movement intact   Neck: Neck supple.   Cardiovascular: Normal rate, regular rhythm, normal heart sounds and normal pulses.   Pulmonary/Chest: Effort normal and breath sounds normal.   Abdominal: Normal appearance.   Neurological: He is alert.   Nursing note and vitals reviewed.        Results for orders placed or performed in visit on 04/12/22   POCT COVID-19 Rapid " Screening   Result Value Ref Range    POC Rapid COVID Negative Negative     Acceptable Yes    POCT Influenza A/B MOLECULAR   Result Value Ref Range    POC Molecular Influenza A Ag Negative Negative, Not Reported    POC Molecular Influenza B Ag Negative Negative, Not Reported     Acceptable Yes      Assessment:       1. Acute suppurative otitis media of left ear without spontaneous rupture of tympanic membrane, recurrence not specified    2. Upper respiratory tract infection, unspecified type          Plan:         Acute suppurative otitis media of left ear without spontaneous rupture of tympanic membrane, recurrence not specified  -     POCT COVID-19 Rapid Screening  -     POCT Influenza A/B MOLECULAR  -     amoxicillin-clavulanate 875-125mg (AUGMENTIN) 875-125 mg per tablet; Take 1 tablet by mouth 2 (two) times daily. for 10 days  Dispense: 20 tablet; Refill: 0    Upper respiratory tract infection, unspecified type  -     benzonatate (TESSALON PERLES) 100 MG capsule; Take 1 capsule (100 mg total) by mouth every 6 (six) hours as needed for Cough.  Dispense: 30 capsule; Refill: 1

## 2023-01-27 ENCOUNTER — OFFICE VISIT (OUTPATIENT)
Dept: URGENT CARE | Facility: CLINIC | Age: 38
End: 2023-01-27
Payer: MEDICAID

## 2023-01-27 VITALS
BODY MASS INDEX: 31.83 KG/M2 | SYSTOLIC BLOOD PRESSURE: 110 MMHG | WEIGHT: 210 LBS | HEART RATE: 88 BPM | OXYGEN SATURATION: 98 % | RESPIRATION RATE: 20 BRPM | HEIGHT: 68 IN | TEMPERATURE: 98 F | DIASTOLIC BLOOD PRESSURE: 70 MMHG

## 2023-01-27 DIAGNOSIS — R50.9 FEVER, UNSPECIFIED FEVER CAUSE: ICD-10-CM

## 2023-01-27 DIAGNOSIS — J06.9 VIRAL UPPER RESPIRATORY ILLNESS: Primary | ICD-10-CM

## 2023-01-27 LAB
CTP QC/QA: YES
CTP QC/QA: YES
POC MOLECULAR INFLUENZA A AGN: NEGATIVE
POC MOLECULAR INFLUENZA B AGN: NEGATIVE
SARS-COV-2 AG RESP QL IA.RAPID: NEGATIVE

## 2023-01-27 PROCEDURE — 3074F PR MOST RECENT SYSTOLIC BLOOD PRESSURE < 130 MM HG: ICD-10-PCS | Mod: CPTII,S$GLB,,

## 2023-01-27 PROCEDURE — 1159F PR MEDICATION LIST DOCUMENTED IN MEDICAL RECORD: ICD-10-PCS | Mod: CPTII,S$GLB,,

## 2023-01-27 PROCEDURE — 87811 SARS CORONAVIRUS 2 ANTIGEN POCT, MANUAL READ: ICD-10-PCS | Mod: QW,S$GLB,,

## 2023-01-27 PROCEDURE — 1159F MED LIST DOCD IN RCRD: CPT | Mod: CPTII,S$GLB,,

## 2023-01-27 PROCEDURE — 99213 PR OFFICE/OUTPT VISIT, EST, LEVL III, 20-29 MIN: ICD-10-PCS | Mod: S$GLB,,,

## 2023-01-27 PROCEDURE — 3078F PR MOST RECENT DIASTOLIC BLOOD PRESSURE < 80 MM HG: ICD-10-PCS | Mod: CPTII,S$GLB,,

## 2023-01-27 PROCEDURE — 3074F SYST BP LT 130 MM HG: CPT | Mod: CPTII,S$GLB,,

## 2023-01-27 PROCEDURE — 3008F BODY MASS INDEX DOCD: CPT | Mod: CPTII,S$GLB,,

## 2023-01-27 PROCEDURE — 87502 POCT INFLUENZA A/B MOLECULAR: ICD-10-PCS | Mod: QW,S$GLB,,

## 2023-01-27 PROCEDURE — 1160F RVW MEDS BY RX/DR IN RCRD: CPT | Mod: CPTII,S$GLB,,

## 2023-01-27 PROCEDURE — 3008F PR BODY MASS INDEX (BMI) DOCUMENTED: ICD-10-PCS | Mod: CPTII,S$GLB,,

## 2023-01-27 PROCEDURE — 1160F PR REVIEW ALL MEDS BY PRESCRIBER/CLIN PHARMACIST DOCUMENTED: ICD-10-PCS | Mod: CPTII,S$GLB,,

## 2023-01-27 PROCEDURE — 3078F DIAST BP <80 MM HG: CPT | Mod: CPTII,S$GLB,,

## 2023-01-27 PROCEDURE — 87811 SARS-COV-2 COVID19 W/OPTIC: CPT | Mod: QW,S$GLB,,

## 2023-01-27 PROCEDURE — 87502 INFLUENZA DNA AMP PROBE: CPT | Mod: QW,S$GLB,,

## 2023-01-27 PROCEDURE — 99213 OFFICE O/P EST LOW 20 MIN: CPT | Mod: S$GLB,,,

## 2023-01-27 RX ORDER — LORATADINE 10 MG/1
10 TABLET ORAL DAILY
Qty: 30 TABLET | Refills: 0 | Status: SHIPPED | OUTPATIENT
Start: 2023-01-27 | End: 2024-01-27

## 2023-01-27 RX ORDER — FLUTICASONE PROPIONATE 50 MCG
1 SPRAY, SUSPENSION (ML) NASAL DAILY
Qty: 9.9 ML | Refills: 0 | Status: SHIPPED | OUTPATIENT
Start: 2023-01-27

## 2023-01-27 RX ORDER — BENZONATATE 100 MG/1
100 CAPSULE ORAL 3 TIMES DAILY PRN
Qty: 30 CAPSULE | Refills: 0 | Status: SHIPPED | OUTPATIENT
Start: 2023-01-27 | End: 2023-02-06

## 2023-01-27 NOTE — PROGRESS NOTES
Subjective:       Patient ID: Yair Flynn is a 37 y.o. male.    Chief Complaint: No chief complaint on file.    HPI  ROS     Objective:      Physical Exam    Assessment:       No diagnosis found.    Plan:                   No follow-ups on file.

## 2023-01-27 NOTE — LETTER
January 27, 2023      [facility]  [address] [address]  Phone: [phone]  Fax: [phone]       Patient: Yair Flynn   YOB: 1985  Date of Visit: 01/27/2023    To Whom It May Concern:    Ladi Flynn  was at [facility] on 01/27/2023. The patient may return to work/school on 1/30/23 with no restrictions. If you have any questions or concerns, or if I can be of further assistance, please do not hesitate to contact me.    Sincerely,    Chioma Delarosa, NP

## 2023-01-27 NOTE — PROGRESS NOTES
"Subjective:       Patient ID: Yair Flynn is a 37 y.o. male.    Vitals:  height is 5' 8" (1.727 m) and weight is 95.3 kg (210 lb). His temperature is 98.2 °F (36.8 °C). His blood pressure is 110/70 and his pulse is 88. His respiration is 20 and oxygen saturation is 98%.     Chief Complaint: URI    36 yo male pt presents to the clinic for productive cough, chills, body aches, postnasal drip, sneezing, sinus pressure and fever of 102. Pt reports that his symptoms started Tuesday and his fever has subsided.    URI   This is a new problem. The current episode started in the past 7 days. The problem has been gradually worsening. The maximum temperature recorded prior to his arrival was 102 - 102.9 F. Associated symptoms include congestion, coughing, headaches, sinus pain and sneezing. Pertinent negatives include no chest pain, diarrhea, nausea or vomiting. Treatments tried: sudafed.     Constitution: Positive for fever. Negative for chills and sweating.   HENT:  Positive for congestion and sinus pain.    Cardiovascular:  Negative for chest pain.   Respiratory:  Positive for cough.    Gastrointestinal:  Negative for nausea, vomiting and diarrhea.   Allergic/Immunologic: Positive for sneezing.   Neurological:  Positive for history of vertigo and headaches. Negative for dizziness.     Objective:      Physical Exam   Constitutional: He is oriented to person, place, and time. He appears well-developed. He is cooperative.  Non-toxic appearance. He does not appear ill. No distress.   HENT:   Head: Normocephalic and atraumatic.   Ears:   Right Ear: Hearing, tympanic membrane, external ear and ear canal normal.   Left Ear: Hearing, tympanic membrane, external ear and ear canal normal.   Nose: Congestion present. No mucosal edema, rhinorrhea or nasal deformity. No epistaxis. Right sinus exhibits no maxillary sinus tenderness and no frontal sinus tenderness. Left sinus exhibits no maxillary sinus tenderness and no frontal " sinus tenderness.   Mouth/Throat: Uvula is midline and mucous membranes are normal. No trismus in the jaw. Normal dentition. No uvula swelling. Posterior oropharyngeal erythema present. No oropharyngeal exudate or posterior oropharyngeal edema.   Eyes: Conjunctivae and lids are normal. No scleral icterus.   Neck: Trachea normal and phonation normal. Neck supple. No edema present. No erythema present. No neck rigidity present.   Cardiovascular: Normal rate, regular rhythm, normal heart sounds and normal pulses.   Pulmonary/Chest: Effort normal and breath sounds normal. No stridor. No respiratory distress. He has no decreased breath sounds. He has no wheezes. He has no rhonchi. He has no rales.   Abdominal: Normal appearance.   Musculoskeletal: Normal range of motion.         General: No deformity. Normal range of motion.   Neurological: He is alert and oriented to person, place, and time. He exhibits normal muscle tone. Coordination normal.   Skin: Skin is warm, dry, intact, not diaphoretic and not pale.   Psychiatric: His speech is normal and behavior is normal. Judgment and thought content normal.   Nursing note and vitals reviewed.      Results for orders placed or performed in visit on 01/27/23   SARS Coronavirus 2 Antigen, POCT Manual Read   Result Value Ref Range    SARS Coronavirus 2 Antigen Negative Negative     Acceptable Yes    POCT Influenza A/B MOLECULAR   Result Value Ref Range    POC Molecular Influenza A Ag Negative Negative, Not Reported    POC Molecular Influenza B Ag Negative Negative, Not Reported     Acceptable Yes        Assessment:       1. Viral upper respiratory illness    2. Fever, unspecified fever cause          Plan:         Viral upper respiratory illness  -     benzonatate (TESSALON) 100 MG capsule; Take 1 capsule (100 mg total) by mouth 3 (three) times daily as needed for Cough.  Dispense: 30 capsule; Refill: 0  -     loratadine (CLARITIN) 10 mg tablet;  Take 1 tablet (10 mg total) by mouth once daily.  Dispense: 30 tablet; Refill: 0  -     fluticasone propionate (FLONASE) 50 mcg/actuation nasal spray; 1 spray (50 mcg total) by Each Nostril route once daily.  Dispense: 9.9 mL; Refill: 0    Fever, unspecified fever cause  -     SARS Coronavirus 2 Antigen, POCT Manual Read  -     POCT Influenza A/B MOLECULAR      Patient Instructions     Patient Instructions   PLEASE READ YOUR DISCHARGE INSTRUCTIONS ENTIRELY AS IT CONTAINS IMPORTANT INFORMATION.     Please drink plenty of fluids.     Please get plenty of rest.     Please return here or go to the Emergency Department for any concerns or worsening of condition.     Please take an over the counter antihistamine medication (allegra/Claritin/Zyrtec) of your choice as directed.     Try an over the counter decongestant like Mucinex D or Sudafed. You buy this behind the pharmacy counter     If you do have Hypertension or palpitations, it is safe to take Coricidin HBP for relief of sinus symptoms.     If not allergic, please take over the counter Tylenol (Acetaminophen) and/or Motrin (Ibuprofen) as directed for control of pain and/or fever.  Please follow up with your primary care doctor or specialist as needed.     Sore throat recommendations: Warm fluids, warm salt water gargles, throat lozenges, tea, honey, soup, rest, hydration.     Use over the counter flonase: one spray each nostril twice daily OR two sprays each nostril once daily.      If you  smoke, please stop smoking.     Please return or see your primary care doctor if you develop new or worsening symptoms.      Please arrange follow up with your primary medical clinic as soon as possible. You must understand that you've received an Urgent Care treatment only and that you may be released before all of your medical problems are known or treated. You, the patient, will arrange for follow up as instructed. If your symptoms worsen or fail to improve you should go to  the Emergency Room.

## 2024-07-10 ENCOUNTER — HOSPITAL ENCOUNTER (EMERGENCY)
Facility: OTHER | Age: 39
Discharge: HOME | End: 2024-07-10
Attending: EMERGENCY MEDICINE
Payer: MEDICAID

## 2024-07-10 VITALS
OXYGEN SATURATION: 99 % | DIASTOLIC BLOOD PRESSURE: 82 MMHG | HEIGHT: 69 IN | RESPIRATION RATE: 18 BRPM | BODY MASS INDEX: 26.66 KG/M2 | TEMPERATURE: 98 F | HEART RATE: 81 BPM | WEIGHT: 180 LBS | SYSTOLIC BLOOD PRESSURE: 132 MMHG

## 2024-07-10 DIAGNOSIS — J06.9 VIRAL URI WITH COUGH: Primary | ICD-10-CM

## 2024-07-10 DIAGNOSIS — R07.89 CHEST TIGHTNESS: ICD-10-CM

## 2024-07-10 DIAGNOSIS — R05.9 COUGH: ICD-10-CM

## 2024-07-10 LAB
CTP QC/QA: YES
CTP QC/QA: YES
OHS QRS DURATION: 84 MS
OHS QTC CALCULATION: 396 MS
POC MOLECULAR INFLUENZA A AGN: NEGATIVE
POC MOLECULAR INFLUENZA B AGN: NEGATIVE
SARS-COV-2 RDRP RESP QL NAA+PROBE: NEGATIVE

## 2024-07-10 PROCEDURE — 93005 ELECTROCARDIOGRAM TRACING: CPT

## 2024-07-10 PROCEDURE — 87635 SARS-COV-2 COVID-19 AMP PRB: CPT | Performed by: EMERGENCY MEDICINE

## 2024-07-10 PROCEDURE — 25000003 PHARM REV CODE 250: Performed by: EMERGENCY MEDICINE

## 2024-07-10 PROCEDURE — 99284 EMERGENCY DEPT VISIT MOD MDM: CPT | Mod: 25

## 2024-07-10 PROCEDURE — 93010 ELECTROCARDIOGRAM REPORT: CPT | Mod: ,,, | Performed by: INTERNAL MEDICINE

## 2024-07-10 RX ORDER — IBUPROFEN 400 MG/1
800 TABLET ORAL
Status: COMPLETED | OUTPATIENT
Start: 2024-07-10 | End: 2024-07-10

## 2024-07-10 RX ADMIN — IBUPROFEN 800 MG: 400 TABLET, FILM COATED ORAL at 08:07

## 2024-07-10 NOTE — ED PROVIDER NOTES
Encounter Date: 7/10/2024       History     Chief Complaint   Patient presents with    Cough     C/o couple days productive cough, headache, body aches. Denies fever. Wife recently with same issues and it resolved.     Seen by physician at 8:25AM:    Patient is a 38 y/o M who presents to the emergency department with 2 days of body aches, cough, chest tightness, shortness breath, and congestion.  Patient denies any actual fevers.  His wife had similar symptoms and now feels improved.  He denies any vomiting or diarrhea.  He denies any COPD or asthma history.  Denies any smoking.        Review of patient's allergies indicates:  No Known Allergies  History reviewed. No pertinent past medical history.  History reviewed. No pertinent surgical history.  Family History   Problem Relation Name Age of Onset    Cancer Mother      No Known Problems Father       Social History     Tobacco Use    Smoking status: Never    Smokeless tobacco: Never   Substance Use Topics    Alcohol use: No     Review of Systems   Constitutional:  Negative for chills and fever.   HENT:  Positive for congestion. Negative for rhinorrhea.    Respiratory:  Positive for cough, chest tightness and shortness of breath.    Cardiovascular:  Negative for chest pain and palpitations.   Gastrointestinal:  Negative for abdominal pain, diarrhea, nausea and vomiting.   Genitourinary:  Negative for dysuria and flank pain.   Musculoskeletal:  Positive for myalgias. Negative for back pain and neck pain.   Skin:  Negative for color change and wound.   Neurological:  Negative for dizziness and headaches.       Physical Exam     Initial Vitals [07/10/24 0744]   BP Pulse Resp Temp SpO2   (!) 140/84 85 18 98.3 °F (36.8 °C) 97 %      MAP       --         Physical Exam    Nursing note and vitals reviewed.  Constitutional: He appears well-developed and well-nourished.   HENT:   Head: Normocephalic and atraumatic.   Eyes: Conjunctivae are normal.   Neck: Neck supple.    Normal range of motion.  Cardiovascular:  Normal rate, regular rhythm and normal heart sounds.           Pulmonary/Chest: Breath sounds normal. No respiratory distress. He has no wheezes. He has no rales.   Musculoskeletal:         General: No tenderness or edema. Normal range of motion.      Cervical back: Normal range of motion and neck supple.     Neurological: He is alert and oriented to person, place, and time.   Ambulatory with steady gait.   Skin: Skin is warm and dry. Capillary refill takes less than 2 seconds.         ED Course   Procedures  Labs Reviewed   SARS-COV-2 RDRP GENE   POCT INFLUENZA A/B MOLECULAR     EKG Readings: (Independently Interpreted)   8:36AM:  Rate of 72.  Normal sinus rhythm.  Normal axis.  Normal intervals.  No ST or ischemic changes.       Imaging Results              X-Ray Chest PA And Lateral (Final result)  Result time 07/10/24 09:12:13      Final result by Angelica Novoa MD (07/10/24 09:12:13)                   Impression:      No acute cardiopulmonary process seen.      Electronically signed by: Angelica Novoa  Date:    07/10/2024  Time:    09:12               Narrative:    EXAMINATION:  XR CHEST PA AND LATERAL    CLINICAL HISTORY:  Cough, unspecified    TECHNIQUE:  PA and lateral views of the chest were performed.    COMPARISON:  12/21/2018    FINDINGS:  Lungs are well expanded.  No acute consolidation, pleural effusion, or pneumothorax seen.    Cardiac silhouette is normal in size.                                    X-Rays:   Independently Interpreted Readings:   Chest X-Ray: Trachea midline.  No cardiomegaly.  No effusion, infiltrate, edema.     Medications   ibuprofen tablet 800 mg (800 mg Oral Given 7/10/24 0846)     Medical Decision Making  8:25AM:  Patient is a 39-year-old male who presents to the emergency department who presents to the emergency department with cough, congestion, myalgias, shortness breath and chest tightness.  Patient appears well, nontoxic.  He  is breathing comfortably with no respiratory distress.  Patient likely has a nonspecific viral illness.  Will plan for COVID/flu, along with a chest x-ray.  Will continue to follow and reassess.    Differential diagnosis:  Influenza, COVID, pneumonia, viral syndrome, upper respiratory infection    Amount and/or Complexity of Data Reviewed  External Data Reviewed: notes.  Labs: ordered. Decision-making details documented in ED Course.  Radiology: ordered and independent interpretation performed. Decision-making details documented in ED Course.  ECG/medicine tests: ordered and independent interpretation performed. Decision-making details documented in ED Course.    Risk  Prescription drug management.    9:32 AM:  Patient doing well, remains stable.  His chest x-ray and EKG are within normal limits.  His COVID and flu were negative.  He likely has a nonspecific viral upper respiratory infection.  I do not feel that further work up in the ED is indicated at this time.  I updated pt regarding results and I counseled pt regarding supportive care measures.  I have discussed with the pt ED return warnings and need for close PCP f/u.  Pt agreeable to plan and all questions answered.  I feel that pt is stable for discharge and management as an outpatient and no further intervention is needed at this time.  Pt is comfortable returning to the ED if needed.  Will DC home in stable condition.                                    Clinical Impression:  Final diagnoses:  [R05.9] Cough  [R07.89] Chest tightness  [J06.9] Viral URI with cough (Primary)          ED Disposition Condition    Discharge Stable          ED Prescriptions    None       Follow-up Information       Follow up With Specialties Details Why Contact Info    Primary Care Physician                 Era Aguila MD  07/10/24 0567

## 2024-07-10 NOTE — Clinical Note
"Yair Acevedo" Gee was seen and treated in our emergency department on 7/10/2024.  He may return to work on 07/12/2024.       If you have any questions or concerns, please don't hesitate to call.      Era Aguila MD"

## 2024-07-15 ENCOUNTER — HOSPITAL ENCOUNTER (EMERGENCY)
Facility: HOSPITAL | Age: 39
Discharge: HOME | End: 2024-07-15
Attending: STUDENT IN AN ORGANIZED HEALTH CARE EDUCATION/TRAINING PROGRAM
Payer: MEDICAID

## 2024-07-15 VITALS
TEMPERATURE: 98 F | RESPIRATION RATE: 18 BRPM | BODY MASS INDEX: 30.31 KG/M2 | HEART RATE: 73 BPM | OXYGEN SATURATION: 99 % | HEIGHT: 68 IN | WEIGHT: 200 LBS | SYSTOLIC BLOOD PRESSURE: 138 MMHG | DIASTOLIC BLOOD PRESSURE: 81 MMHG

## 2024-07-15 DIAGNOSIS — J20.9 ACUTE BRONCHITIS, UNSPECIFIED ORGANISM: Primary | ICD-10-CM

## 2024-07-15 LAB
ALBUMIN SERPL BCP-MCNC: 3.7 G/DL (ref 3.5–5.2)
ALP SERPL-CCNC: 105 U/L (ref 55–135)
ALT SERPL W/O P-5'-P-CCNC: 21 U/L (ref 10–44)
ANION GAP SERPL CALC-SCNC: 7 MMOL/L (ref 8–16)
AST SERPL-CCNC: 16 U/L (ref 10–40)
BASOPHILS # BLD AUTO: 0.04 K/UL (ref 0–0.2)
BASOPHILS NFR BLD: 0.4 % (ref 0–1.9)
BILIRUB SERPL-MCNC: 0.3 MG/DL (ref 0.1–1)
BUN SERPL-MCNC: 12 MG/DL (ref 6–20)
CALCIUM SERPL-MCNC: 9.2 MG/DL (ref 8.7–10.5)
CHLORIDE SERPL-SCNC: 107 MMOL/L (ref 95–110)
CO2 SERPL-SCNC: 24 MMOL/L (ref 23–29)
CREAT SERPL-MCNC: 0.9 MG/DL (ref 0.5–1.4)
DIFFERENTIAL METHOD BLD: NORMAL
EOSINOPHIL # BLD AUTO: 0.2 K/UL (ref 0–0.5)
EOSINOPHIL NFR BLD: 2.4 % (ref 0–8)
ERYTHROCYTE [DISTWIDTH] IN BLOOD BY AUTOMATED COUNT: 13.1 % (ref 11.5–14.5)
EST. GFR  (NO RACE VARIABLE): >60 ML/MIN/1.73 M^2
GLUCOSE SERPL-MCNC: 96 MG/DL (ref 70–110)
HCT VFR BLD AUTO: 46.1 % (ref 40–54)
HCV AB SERPL QL IA: NORMAL
HGB BLD-MCNC: 15.4 G/DL (ref 14–18)
HIV 1+2 AB+HIV1 P24 AG SERPL QL IA: NORMAL
IMM GRANULOCYTES # BLD AUTO: 0.04 K/UL (ref 0–0.04)
IMM GRANULOCYTES NFR BLD AUTO: 0.4 % (ref 0–0.5)
LYMPHOCYTES # BLD AUTO: 4.1 K/UL (ref 1–4.8)
LYMPHOCYTES NFR BLD: 43.8 % (ref 18–48)
MCH RBC QN AUTO: 28.2 PG (ref 27–31)
MCHC RBC AUTO-ENTMCNC: 33.4 G/DL (ref 32–36)
MCV RBC AUTO: 84 FL (ref 82–98)
MONOCYTES # BLD AUTO: 1 K/UL (ref 0.3–1)
MONOCYTES NFR BLD: 10.3 % (ref 4–15)
NEUTROPHILS # BLD AUTO: 4 K/UL (ref 1.8–7.7)
NEUTROPHILS NFR BLD: 42.7 % (ref 38–73)
NRBC BLD-RTO: 0 /100 WBC
PLATELET # BLD AUTO: 245 K/UL (ref 150–450)
PMV BLD AUTO: 11.7 FL (ref 9.2–12.9)
POTASSIUM SERPL-SCNC: 4 MMOL/L (ref 3.5–5.1)
PROT SERPL-MCNC: 7.9 G/DL (ref 6–8.4)
RBC # BLD AUTO: 5.47 M/UL (ref 4.6–6.2)
SARS-COV-2 RDRP RESP QL NAA+PROBE: NEGATIVE
SODIUM SERPL-SCNC: 138 MMOL/L (ref 136–145)
WBC # BLD AUTO: 9.4 K/UL (ref 3.9–12.7)

## 2024-07-15 PROCEDURE — 87389 HIV-1 AG W/HIV-1&-2 AB AG IA: CPT | Performed by: PHYSICIAN ASSISTANT

## 2024-07-15 PROCEDURE — 63600175 PHARM REV CODE 636 W HCPCS: Performed by: PHYSICIAN ASSISTANT

## 2024-07-15 PROCEDURE — 25000242 PHARM REV CODE 250 ALT 637 W/ HCPCS: Performed by: PHYSICIAN ASSISTANT

## 2024-07-15 PROCEDURE — 80053 COMPREHEN METABOLIC PANEL: CPT | Performed by: EMERGENCY MEDICINE

## 2024-07-15 PROCEDURE — 94761 N-INVAS EAR/PLS OXIMETRY MLT: CPT

## 2024-07-15 PROCEDURE — 86803 HEPATITIS C AB TEST: CPT | Performed by: PHYSICIAN ASSISTANT

## 2024-07-15 PROCEDURE — 99284 EMERGENCY DEPT VISIT MOD MDM: CPT | Mod: 25

## 2024-07-15 PROCEDURE — 94640 AIRWAY INHALATION TREATMENT: CPT

## 2024-07-15 PROCEDURE — U0002 COVID-19 LAB TEST NON-CDC: HCPCS | Performed by: EMERGENCY MEDICINE

## 2024-07-15 PROCEDURE — 85025 COMPLETE CBC W/AUTO DIFF WBC: CPT | Performed by: EMERGENCY MEDICINE

## 2024-07-15 RX ORDER — PREDNISONE 20 MG/1
40 TABLET ORAL DAILY
Qty: 10 TABLET | Refills: 0 | OUTPATIENT
Start: 2024-07-15 | End: 2024-07-20

## 2024-07-15 RX ORDER — IPRATROPIUM BROMIDE AND ALBUTEROL SULFATE 2.5; .5 MG/3ML; MG/3ML
3 SOLUTION RESPIRATORY (INHALATION)
Status: COMPLETED | OUTPATIENT
Start: 2024-07-15 | End: 2024-07-15

## 2024-07-15 RX ORDER — PREDNISONE 20 MG/1
60 TABLET ORAL
Status: COMPLETED | OUTPATIENT
Start: 2024-07-15 | End: 2024-07-15

## 2024-07-15 RX ORDER — PROMETHAZINE HYDROCHLORIDE AND DEXTROMETHORPHAN HYDROBROMIDE 6.25; 15 MG/5ML; MG/5ML
5 SYRUP ORAL EVERY 4 HOURS PRN
Qty: 118 ML | Refills: 0 | Status: SHIPPED | OUTPATIENT
Start: 2024-07-15 | End: 2024-07-20

## 2024-07-15 RX ORDER — ALBUTEROL SULFATE 90 UG/1
1-2 AEROSOL, METERED RESPIRATORY (INHALATION) EVERY 6 HOURS PRN
Qty: 8.5 G | Refills: 0 | Status: SHIPPED | OUTPATIENT
Start: 2024-07-15

## 2024-07-15 RX ADMIN — IPRATROPIUM BROMIDE AND ALBUTEROL SULFATE 3 ML: 2.5; .5 SOLUTION RESPIRATORY (INHALATION) at 07:07

## 2024-07-15 RX ADMIN — PREDNISONE 60 MG: 20 TABLET ORAL at 07:07

## 2024-07-15 NOTE — ED PROVIDER NOTES
Encounter Date: 7/15/2024       History     Chief Complaint   Patient presents with    URI     Seen in er sat, told virus, still coughing, now turned green     39-year-old male presents to the ER for evaluation of URI symptoms ongoing for the last 1 week.  Patient states that he has a persistent cough that is now turned productive.  He reports headache, runny nose.  States that he had a fever on Friday of 103.  Denies any associated shortness of breath.  No abdominal pain, nausea vomiting or diarrhea.  He denies any history of asthma or COPD.  Nonsmoker.    Was seen in the emergency room 5 days ago and was diagnosed with an upper respiratory infection.  He has been using over-the-counter cough and cold medicine with out much improvement.    The history is provided by the patient.     Review of patient's allergies indicates:  No Known Allergies  History reviewed. No pertinent past medical history.  History reviewed. No pertinent surgical history.  Family History   Problem Relation Name Age of Onset    Cancer Mother      No Known Problems Father       Social History     Tobacco Use    Smoking status: Never    Smokeless tobacco: Never   Substance Use Topics    Alcohol use: No     Review of Systems   Constitutional:  Positive for fever. Negative for chills.   HENT:  Positive for congestion and rhinorrhea.    Eyes:  Negative for visual disturbance.   Respiratory:  Positive for cough. Negative for shortness of breath.    Cardiovascular:  Negative for chest pain.   Gastrointestinal:  Negative for nausea and vomiting.   Genitourinary:  Negative for dysuria and flank pain.   Musculoskeletal:  Negative for myalgias.   Skin:  Negative for rash.   Allergic/Immunologic: Negative for immunocompromised state.   Neurological:  Negative for weakness and numbness.   Hematological:  Does not bruise/bleed easily.   Psychiatric/Behavioral:  Negative for confusion.        Physical Exam     Initial Vitals [07/15/24 1725]   BP Pulse Resp  Temp SpO2   133/83 74 20 98.4 °F (36.9 °C) 96 %      MAP       --         Physical Exam    Vitals reviewed.  Constitutional: He appears well-developed and well-nourished. He is not diaphoretic. No distress.   HENT:   Head: Normocephalic and atraumatic.   Nose: Mucosal edema and rhinorrhea present.   Mouth/Throat: Uvula is midline, oropharynx is clear and moist and mucous membranes are normal.   Eyes: Conjunctivae and EOM are normal.   Neck: Neck supple.   Cardiovascular:  Normal rate, regular rhythm, normal heart sounds and intact distal pulses.           Pulmonary/Chest: No respiratory distress. He has wheezes.   Abdominal: Abdomen is soft. He exhibits no distension. There is no abdominal tenderness. There is no rebound.   Musculoskeletal:         General: Normal range of motion.      Cervical back: Neck supple.     Neurological: He is alert and oriented to person, place, and time.   Skin: Skin is warm.         ED Course   Procedures  Labs Reviewed   COMPREHENSIVE METABOLIC PANEL - Abnormal; Notable for the following components:       Result Value    Anion Gap 7 (*)     All other components within normal limits    Narrative:     Release to patient->Immediate   CBC W/ AUTO DIFFERENTIAL    Narrative:     Release to patient->Immediate   SARS-COV-2 RNA AMPLIFICATION, QUAL   HIV 1 / 2 ANTIBODY    Narrative:     Release to patient->Immediate   HEPATITIS C ANTIBODY    Narrative:     Release to patient->Immediate          Imaging Results              X-Ray Chest AP Portable (Final result)  Result time 07/15/24 22:11:11      Final result by Bebeto Turcios MD (07/15/24 22:11:11)                   Impression:      No acute abnormality.      Electronically signed by: Bebeto Turcios  Date:    07/15/2024  Time:    22:11               Narrative:    EXAMINATION:  XR CHEST AP PORTABLE    CLINICAL HISTORY:  cough;    TECHNIQUE:  Single frontal view of the chest was performed.    COMPARISON:  07/10/2024    FINDINGS:  The lungs are  clear, with normal appearance of pulmonary vasculature and no pleural effusion or pneumothorax.    The cardiac silhouette is normal in size. The hilar and mediastinal contours are unremarkable.    Bones are intact.                                       Medications   albuterol-ipratropium 2.5 mg-0.5 mg/3 mL nebulizer solution 3 mL (3 mLs Nebulization Given 7/15/24 1918)   predniSONE tablet 60 mg (60 mg Oral Given 7/15/24 1902)     Medical Decision Making  Differential diagnosis:    URI, pneumonia, pleural effusion, bronchitis    Risk  Prescription drug management.         APC / Resident Notes:   Patient seen in the ER promptly upon arrival.  He is afebrile, no acute distress.  Physical examination reveals slightly diminished breath sounds bilaterally with wheezing noted on expiration.  He is however able to speak in complete full sentences without difficulty.  O2 saturation of 96% on room air.  Persistent cough while in the room during exam.  Oropharynx is patent.  No erythema or exudates.  Uvula midline.  No evidence of PTA.    Will obtain x-ray, lab work and provide breathing treatment.  COVID and flu test pending        ED Course as of 07/15/24 2255   Mon Jul 15, 2024   2014 Lab Studies show normal white count of 9.4.  Hemoglobin stable.  Chemistries unremarkable.  COVID and flu test negative. [AJ]   2255 There was significant delay in obtaining x-ray results today     Chest x-ray unremarkable. [AJ]   2255 Findings consistent with acute bronchitis.  Will prescribed home with albuterol inhaler, cough medication and prednisone to use as directed.  Will advise patient to follow up with primary doctor in the next couple days.  Given strict return precautions ED which he is agreeable to.  Vitals stable at discharge. [AJ]      ED Course User Index  [AJ] Gale Donis PA-C                           Clinical Impression:  Final diagnoses:  [J20.9] Acute bronchitis, unspecified organism (Primary)          ED  Disposition Condition    Discharge Stable          ED Prescriptions       Medication Sig Dispense Start Date End Date Auth. Provider    albuterol (PROVENTIL/VENTOLIN HFA) 90 mcg/actuation inhaler Inhale 1-2 puffs into the lungs every 6 (six) hours as needed for Wheezing. Rescue 8.5 g 7/15/2024 -- Gale Donis PA-C    promethazine-dextromethorphan (PROMETHAZINE-DM) 6.25-15 mg/5 mL Syrp Take 5 mLs by mouth every 4 (four) hours as needed. 118 mL 7/15/2024 7/25/2024 Gale Donis PA-C    predniSONE (DELTASONE) 20 MG tablet Take 2 tablets (40 mg total) by mouth once daily. for 5 days 10 tablet 7/15/2024 7/20/2024 Gale Donis PA-C          Follow-up Information    None          Gale Donis PA-C  07/15/24 7127

## 2024-07-15 NOTE — FIRST PROVIDER EVALUATION
"Medical screening examination initiated.  I have conducted a focused provider triage encounter, findings are as follows:    Brief history of present illness:  40 y/o M presenting with cough, body aches, congestion and fevers. Recently seen at [facility] ED but continues to have symptoms. No hx of COPD or asthma.     Vitals:    07/15/24 1725   BP: 133/83   Pulse: 74   Resp: 20   TempSrc: Oral   SpO2: 96%   Weight: 90.7 kg (200 lb)   Height: 5' 8" (1.727 m)       Pertinent physical exam:  cough productive    Brief workup plan:  CXR, COVID, labs    Preliminary workup initiated; this workup will be continued and followed by the physician or advanced practice provider that is assigned to the patient when roomed.    Nadeem Cisneros DO, FAAEM  Emergency Staff Physician   Dept of Emergency Medicine   [facility]  Spectralink: [id number]        Disclaimer: This note has been generated using voice-recognition software. There may be typographical errors that have been missed during proof-reading.    "

## 2024-07-15 NOTE — ED TRIAGE NOTES
Yair Flynn, a 39 y.o. male presents to the ED w/ complaint of virus, headache, seen in er Saturday. Symptoms have not gotten better     Triage note:  Chief Complaint   Patient presents with    URI     Seen in er sat, told virus, still coughing, now turned green     Review of patient's allergies indicates:  No Known Allergies  History reviewed. No pertinent past medical history.

## 2024-07-15 NOTE — Clinical Note
"Yair Virgen (David)za was seen and treated in our emergency department on 7/15/2024.  He may return to work on 07/18/2024.       If you have any questions or concerns, please don't hesitate to call.       LPN    "

## 2024-07-16 NOTE — DISCHARGE INSTRUCTIONS
Use medication as prescribed.  Use inhaler as needed for persistent cough or shortness of breath.  Follow-up with your primary care physician in the next 2-3 days.  Return to ER with concerning or worsening symptoms.

## 2024-07-20 ENCOUNTER — HOSPITAL ENCOUNTER (EMERGENCY)
Facility: OTHER | Age: 39
Discharge: HOME | End: 2024-07-20
Attending: EMERGENCY MEDICINE
Payer: MEDICAID

## 2024-07-20 VITALS
TEMPERATURE: 98 F | RESPIRATION RATE: 18 BRPM | SYSTOLIC BLOOD PRESSURE: 130 MMHG | BODY MASS INDEX: 30.31 KG/M2 | OXYGEN SATURATION: 99 % | HEART RATE: 72 BPM | WEIGHT: 200 LBS | HEIGHT: 68 IN | DIASTOLIC BLOOD PRESSURE: 72 MMHG

## 2024-07-20 DIAGNOSIS — J20.8 VIRAL BRONCHITIS: Primary | ICD-10-CM

## 2024-07-20 LAB
CTP QC/QA: YES
SARS-COV-2 RDRP RESP QL NAA+PROBE: NEGATIVE

## 2024-07-20 PROCEDURE — 87635 SARS-COV-2 COVID-19 AMP PRB: CPT | Performed by: EMERGENCY MEDICINE

## 2024-07-20 PROCEDURE — 99284 EMERGENCY DEPT VISIT MOD MDM: CPT

## 2024-07-20 RX ORDER — BENZONATATE 100 MG/1
100 CAPSULE ORAL 3 TIMES DAILY PRN
Qty: 20 CAPSULE | Refills: 0 | Status: SHIPPED | OUTPATIENT
Start: 2024-07-20 | End: 2024-07-30

## 2024-07-20 RX ORDER — PROMETHAZINE HYDROCHLORIDE AND DEXTROMETHORPHAN HYDROBROMIDE 6.25; 15 MG/5ML; MG/5ML
5 SYRUP ORAL EVERY 4 HOURS PRN
Qty: 118 ML | Refills: 0 | Status: SHIPPED | OUTPATIENT
Start: 2024-07-20 | End: 2024-07-30

## 2024-07-20 NOTE — ED PROVIDER NOTES
Chief complaint:  Cough (Pt reports constant cough x 2 weeks. Pt seen in ED and dx with bronchitis but states he ran out of rx medications)      Source of information:  Patient, old chart    HPI:  Yair Flynn is a 39 y.o. male presenting with ongoing cough for the past week and a half.  Patient has been seen 2 other times for this.  Reports the medicines were working but he has run out of them.  He states that in general he has started to improve although still has bursts of coughing episodes.  Denies fevers.  No chest pain.  Sore throat from the coughing, but no trouble swallowing.  Denies history of asthma.  No other acute complaints    ROS: As per HPI    Review of patient's allergies indicates:  No Known Allergies    No current facility-administered medications on file prior to encounter.     Current Outpatient Medications on File Prior to Encounter   Medication Sig Dispense Refill    albuterol (PROVENTIL/VENTOLIN HFA) 90 mcg/actuation inhaler Inhale 1-2 puffs into the lungs every 6 (six) hours as needed for Wheezing. Rescue 8.5 g 0    fluticasone propionate (FLONASE) 50 mcg/actuation nasal spray 1 spray (50 mcg total) by Each Nostril route once daily. 9.9 mL 0    loratadine (CLARITIN) 10 mg tablet Take 1 tablet (10 mg total) by mouth once daily. 30 tablet 0    [DISCONTINUED] azithromycin (ZITHROMAX Z-ROSEMARY) 250 MG tablet Take 2 tablets (500 mg) on  Day 1,  followed by 1 tablet (250 mg) once daily on Days 2 through 5. (Patient not taking: Reported on 9/29/2020) 6 tablet 0    [DISCONTINUED] predniSONE (DELTASONE) 20 MG tablet Take 2 tablets (40 mg total) by mouth once daily. for 5 days 10 tablet 0    [DISCONTINUED] promethazine-dextromethorphan (PROMETHAZINE-DM) 6.25-15 mg/5 mL Syrp Take 5 mLs by mouth nightly as needed. (Patient not taking: Reported on 9/29/2020) 60 mL 0    [DISCONTINUED] promethazine-dextromethorphan (PROMETHAZINE-DM) 6.25-15 mg/5 mL Syrp Take 5 mLs by mouth every 4 (four) hours as needed.  118 mL 0       PMH:  As per HPI and below:  History reviewed. No pertinent past medical history.  History reviewed. No pertinent surgical history.      Physical Exam:    Vitals:    07/20/24 1615   BP: 130/72   Pulse: 72   Resp: 18   Temp: 98.4 °F (36.9 °C)       General: No acute distress. Well developed. Well nourished.  Eyes: PERRL. EOM intact. no photophobia, no nystagmus  Conjunctivae - no pallor or icterus.   ENT: HEAD: Normal - atraumatic. Normal external ears. Normal nose.  No facial asymmetry. Mucous membranes - moist.  No erythema or exudate of pharynx  Neck: Neck supple. no meningismus. No cervical lymphadenopathy.  Cardiovascular: Regular rate and rhythm. Normal S1 and S2. No murmur. No gallop. No rub.  2+ peripheral pulses  Respiratory: Normal breath sounds. No rales. No rhonchi. No wheezes. No tachypnea with exertion.  Musculoskeletal:  Normal weight-bearing and gait No deformities. Normal ROM x4.   Integument: No acute skin rashes. No clubbing or cyanosis  Neurologic: No gross neurological deficits.    Psychiatric: Awake, alert.  Oriented x3.  Normal speech and mentation.        Labs Reviewed   SARS-COV-2 RDRP GENE       Result Value    POC Rapid COVID Negative       Acceptable Yes         Medications - No data to display    Medical Decision Making  Differential diagnosis includes pneumonia, COVID viral bronchitis, bronchospasm    Amount and/or Complexity of Data Reviewed  External Data Reviewed: labs, radiology and notes.  Labs: ordered. Decision-making details documented in ED Course.    Risk  Prescription drug management.  Decision regarding hospitalization.            MDM:    39 y.o. male with no complicating medical history presents with cough, clinical picture suggestive of viral bronchitis for the past week or so.  Symptoms are improving.  He is currently afebrile, stable vital signs.  Clear lung fields on exam.  Repeat COVID test remains negative.  The patient did well on peak  flow suggesting against bronchospasm, asthma.  At this time I do not think bacterial source/pneumonia is likely nor are antibiotics warranted.  Will give refill of antitussives.  Work note provided.  Encouraged follow-up with primary care, especially if symptoms persist.  Return precautions discussed for the interim.    Medications - No data to display    ASSESSMENT:   1. Viral bronchitis             Wilmer Elmore II, MD  07/20/24 4713

## 2024-07-20 NOTE — Clinical Note
"Yair Flynn (David) was seen and treated in our emergency department on 7/20/2024.  He may return to work on 07/23/2024.       If you have any questions or concerns, please don't hesitate to call.      Christina Kelley RN    "

## 2024-07-23 ENCOUNTER — OFFICE VISIT (OUTPATIENT)
Dept: URGENT CARE | Facility: CLINIC | Age: 39
End: 2024-07-23
Payer: MEDICAID

## 2024-07-23 VITALS
SYSTOLIC BLOOD PRESSURE: 121 MMHG | HEART RATE: 89 BPM | OXYGEN SATURATION: 96 % | HEIGHT: 68 IN | TEMPERATURE: 98 F | BODY MASS INDEX: 30.3 KG/M2 | RESPIRATION RATE: 18 BRPM | DIASTOLIC BLOOD PRESSURE: 86 MMHG | WEIGHT: 199.94 LBS

## 2024-07-23 DIAGNOSIS — J98.01 COUGH DUE TO BRONCHOSPASM: Primary | ICD-10-CM

## 2024-07-23 DIAGNOSIS — J06.9 VIRAL UPPER RESPIRATORY ILLNESS: ICD-10-CM

## 2024-07-23 DIAGNOSIS — R06.2 WHEEZING: ICD-10-CM

## 2024-07-23 PROCEDURE — 94640 AIRWAY INHALATION TREATMENT: CPT | Mod: S$GLB,,, | Performed by: FAMILY MEDICINE

## 2024-07-23 PROCEDURE — 99214 OFFICE O/P EST MOD 30 MIN: CPT | Mod: 25,S$GLB,, | Performed by: FAMILY MEDICINE

## 2024-07-23 RX ORDER — ALBUTEROL SULFATE 0.83 MG/ML
2.5 SOLUTION RESPIRATORY (INHALATION)
Status: COMPLETED | OUTPATIENT
Start: 2024-07-23 | End: 2024-07-23

## 2024-07-23 RX ORDER — DEXAMETHASONE SODIUM PHOSPHATE 100 MG/10ML
10 INJECTION INTRAMUSCULAR; INTRAVENOUS
Status: COMPLETED | OUTPATIENT
Start: 2024-07-23 | End: 2024-07-23

## 2024-07-23 RX ORDER — PREDNISONE 20 MG/1
40 TABLET ORAL DAILY
Qty: 10 TABLET | Refills: 0 | Status: SHIPPED | OUTPATIENT
Start: 2024-07-23 | End: 2024-07-28

## 2024-07-23 RX ADMIN — ALBUTEROL SULFATE 2.5 MG: 0.83 SOLUTION RESPIRATORY (INHALATION) at 12:07

## 2024-07-23 RX ADMIN — DEXAMETHASONE SODIUM PHOSPHATE 10 MG: 100 INJECTION INTRAMUSCULAR; INTRAVENOUS at 12:07

## 2024-07-23 NOTE — PROGRESS NOTES
"Subjective:      Patient ID: Yair Flynn is a 39 y.o. male.    Vitals:  height is 5' 8" (1.727 m) and weight is 90.7 kg (199 lb 15.3 oz). His oral temperature is 98.4 °F (36.9 °C). His blood pressure is 121/86 and his pulse is 89. His respiration is 18 and oxygen saturation is 96%.     Chief Complaint: Cough    Pt presents with productive coughing causing chest congest, wheezing, sob due to cough diagnosed with bronchitis two weeks ago, coughing so bad unable to sleep, feels the pain in lungs. Felt like was getting better from the medication but lastnight was really bad with the continuous coughing,     Cough  This is a new problem. The current episode started 1 to 4 weeks ago. The problem has been gradually worsening. The problem occurs constantly. The cough is Productive of sputum. Associated symptoms include shortness of breath and wheezing. Pertinent negatives include no nasal congestion. Nothing aggravates the symptoms. Treatments tried: prescribed meds. The treatment provided no relief. His past medical history is significant for bronchitis.       Respiratory:  Positive for cough, shortness of breath and wheezing.       Objective:     Physical Exam   Constitutional: He is oriented to person, place, and time. He appears well-developed. He is cooperative.  Non-toxic appearance. He does not appear ill. No distress.   HENT:   Head: Normocephalic and atraumatic.   Ears:   Right Ear: Hearing, tympanic membrane and external ear normal.   Left Ear: Hearing, tympanic membrane and external ear normal.   Nose: Nose normal. No mucosal edema, rhinorrhea or nasal deformity. No epistaxis. Right sinus exhibits no maxillary sinus tenderness and no frontal sinus tenderness. Left sinus exhibits no maxillary sinus tenderness and no frontal sinus tenderness.   Mouth/Throat: Uvula is midline and mucous membranes are normal. No trismus in the jaw. Normal dentition. No uvula swelling. Posterior oropharyngeal erythema present. " No oropharyngeal exudate or posterior oropharyngeal edema.   Eyes: Conjunctivae and lids are normal. No scleral icterus.   Neck: Trachea normal and phonation normal. Neck supple. No edema present. No erythema present. No neck rigidity present.   Cardiovascular: Normal rate, regular rhythm, normal heart sounds and normal pulses.   Pulmonary/Chest: Effort normal and breath sounds normal. No respiratory distress. He has no decreased breath sounds. He has no rhonchi.         Comments: Expiratory phase interrupted by persistent coughing     Abdominal: Normal appearance.   Musculoskeletal: Normal range of motion.         General: No deformity. Normal range of motion.   Neurological: He is alert and oriented to person, place, and time. He exhibits normal muscle tone. Coordination normal.   Skin: Skin is warm, dry, intact, not diaphoretic and not pale.   Psychiatric: His speech is normal and behavior is normal. Judgment and thought content normal.   Nursing note and vitals reviewed.      Assessment:     1. Cough due to bronchospasm    2. Viral upper respiratory illness    3. Wheezing        Plan:       Cough due to bronchospasm  -     albuterol nebulizer solution 2.5 mg  -     dexAMETHasone injection 10 mg  -     predniSONE (DELTASONE) 20 MG tablet; Take 2 tablets (40 mg total) by mouth once daily. for 5 days  Dispense: 10 tablet; Refill: 0    Viral upper respiratory illness  -     predniSONE (DELTASONE) 20 MG tablet; Take 2 tablets (40 mg total) by mouth once daily. for 5 days  Dispense: 10 tablet; Refill: 0    Wheezing  -     albuterol nebulizer solution 2.5 mg  -     predniSONE (DELTASONE) 20 MG tablet; Take 2 tablets (40 mg total) by mouth once daily. for 5 days  Dispense: 10 tablet; Refill: 0          Medical Decision Making:   Initial Assessment:   Reviewed the previous ER visit and the chest xrays from the visit. Viral bronchitis with persistent coughing. No history of asthma         Thank you for choosing [facility]!     Our goal in the Urgent Care is to always provide outstanding medical care. You may receive a survey by mail or e-mail in the next week regarding your experience today. We would greatly appreciate you completing and returning the survey. Your feedback provides us with a way to recognize our staff who provide very good care, and it helps us learn how to improve when your experience was below our aspiration of excellence.       We appreciate you trusting us with your medical care. We hope you feel better soon. We will be happy to take care of you for all of your future medical needs.  You must understand that you've received an Urgent Care treatment only and that you may be released before all your medical problems are known or treated. You, the patient, will arrange for follow up care as instructed.  Follow up with your PCP or specialty clinic as directed in the next 1-2 weeks if not improved or as needed.  You can call [phone] to schedule an appointment with the appropriate provider.  Another option is to follow up with [facility] ([email or web address]) virtually for quick simple medical advice.  If your condition worsens we recommend that you receive another evaluation at the emergency room immediately or contact your primary medical clinics after hours call service to discuss your concerns.  Please return here or go to the Emergency Department for any concerns or worsening of condition.      *If you were prescribed a narcotic or controlled medication, do not drive or operate heavy equipment or machinery while taking these medications.

## 2024-07-23 NOTE — LETTER
July 23, 2024      [facility] [address]  [place] [address]  Phone: [phone]  Fax: [phone]       Patient: Yair Flynn   YOB: 1985  Date of Visit: 07/23/2024    To Whom It May Concern:    Ladi Flynn  was at [facility] on 07/23/2024. The patient may return to work/school on 07/26/2024 with no restrictions. If you have any questions or concerns, or if I can be of further assistance, please do not hesitate to contact me.    Sincerely,    Humberto Zimmer MD

## 2024-07-25 ENCOUNTER — OFFICE VISIT (OUTPATIENT)
Dept: URGENT CARE | Facility: CLINIC | Age: 39
End: 2024-07-25
Payer: MEDICAID

## 2024-07-25 VITALS
OXYGEN SATURATION: 97 % | HEART RATE: 120 BPM | BODY MASS INDEX: 30.16 KG/M2 | DIASTOLIC BLOOD PRESSURE: 71 MMHG | TEMPERATURE: 98 F | WEIGHT: 199 LBS | HEIGHT: 68 IN | RESPIRATION RATE: 20 BRPM | SYSTOLIC BLOOD PRESSURE: 113 MMHG

## 2024-07-25 DIAGNOSIS — H65.92 LEFT OTITIS MEDIA WITH EFFUSION: Primary | ICD-10-CM

## 2024-07-25 DIAGNOSIS — J06.9 VIRAL UPPER RESPIRATORY ILLNESS: ICD-10-CM

## 2024-07-25 PROBLEM — M54.16 ACUTE LEFT LUMBAR RADICULOPATHY: Status: ACTIVE | Noted: 2023-05-10

## 2024-07-25 PROCEDURE — 99213 OFFICE O/P EST LOW 20 MIN: CPT | Mod: S$GLB,,, | Performed by: PHYSICIAN ASSISTANT

## 2024-07-25 RX ORDER — AMOXICILLIN 500 MG/1
500 TABLET, FILM COATED ORAL EVERY 12 HOURS
Qty: 20 TABLET | Refills: 0 | Status: SHIPPED | OUTPATIENT
Start: 2024-07-25 | End: 2024-08-04

## 2024-07-25 NOTE — PROGRESS NOTES
"Subjective:      Patient ID: Yair Flynn is a 39 y.o. male.    Vitals:  height is 5' 8" (1.727 m) and weight is 90.3 kg (199 lb). His temperature is 98.3 °F (36.8 °C). His blood pressure is 113/71 and his pulse is 120 (abnormal). His respiration is 20 and oxygen saturation is 97%.     Chief Complaint: Cough    39 year old male presents today with a cough, left earache, ear pressure, HA, popping sensation in ear. Cough started about 2 weeks ago. States the earache is new and started about 2 days ago.  Pain scale 05/10 in ear. Describes the earache as a sharp pain. Treatments at home includes nothing OTC.  Currently on Rxs for Tessalon Pearls and Steroids. States concerns today are for ear     Otalgia   There is pain in the left ear. This is a new problem. The current episode started in the past 7 days. The problem occurs constantly. The problem has been unchanged. There has been no fever. Associated symptoms include coughing, headaches and hearing loss. Pertinent negatives include no abdominal pain, diarrhea, ear discharge, neck pain, rash, rhinorrhea, sore throat or vomiting.       Constitution: Negative for chills and fever.   HENT:  Positive for ear pain, tinnitus and hearing loss. Negative for ear discharge, congestion, postnasal drip, sinus pain, sinus pressure and sore throat.    Neck: Negative for neck pain and neck stiffness.   Cardiovascular:  Negative for chest pain.   Eyes:  Negative for eye discharge and eye itching.   Respiratory:  Positive for cough. Negative for chest tightness, sputum production and shortness of breath.    Gastrointestinal:  Negative for abdominal pain, nausea, vomiting, constipation and diarrhea.   Musculoskeletal:  Negative for muscle ache.   Skin:  Negative for rash.   Neurological:  Positive for headaches. Negative for dizziness.    History reviewed. No pertinent past medical history.    History reviewed. No pertinent surgical history.    Family History   Problem Relation " Name Age of Onset    Cancer Mother      No Known Problems Father         Social History     Socioeconomic History    Marital status:    Tobacco Use    Smoking status: Never    Smokeless tobacco: Never   Substance and Sexual Activity    Alcohol use: No    Sexual activity: Yes     Partners: Female     Social Determinants of Health     Financial Resource Strain: Unknown (7/20/2022)    Received from [facility]    Overall Financial Resource Strain (CARDIA)     Difficulty of Paying Living Expenses: Patient declined   Food Insecurity: Unknown (7/20/2022)    Received from [facility]    Hunger Vital Sign     Worried About Running Out of Food in the Last Year: Patient declined     Ran Out of Food in the Last Year: Patient declined   Transportation Needs: Unknown (7/20/2022)    Received from [facility]    PRAPARE - Transportation     Lack of Transportation (Medical): Patient declined     Lack of Transportation (Non-Medical): Patient declined   Physical Activity: Insufficiently Active (7/20/2022)    Received from [facility]    Exercise Vital Sign     Days of Exercise per Week: 3 days     Minutes of Exercise per Session: 40 min   Stress: Stress Concern Present (7/20/2022)    Received from [facility]    [employer] [place] [employer] - Occupational Stress Questionnaire     Feeling of Stress : To some extent       Current Outpatient Medications   Medication Sig Dispense Refill    albuterol (PROVENTIL/VENTOLIN HFA) 90 mcg/actuation inhaler Inhale 1-2 puffs into the lungs every 6 (six) hours as needed for Wheezing. Rescue 8.5 g 0    benzonatate (TESSALON) 100 MG capsule Take 1 capsule (100 mg total) by mouth 3 (three) times daily as needed for Cough. 20 capsule 0    fluticasone propionate (FLONASE) 50 mcg/actuation nasal spray 1 spray (50 mcg total) by Each Nostril route once daily. 9.9 mL 0    predniSONE (DELTASONE) 20 MG tablet Take 2 tablets (40 mg total) by mouth once daily. for 5 days 10 tablet 0     promethazine-dextromethorphan (PROMETHAZINE-DM) 6.25-15 mg/5 mL Syrp Take 5 mLs by mouth every 4 (four) hours as needed. 118 mL 0    amoxicillin (AMOXIL) 500 MG Tab Take 1 tablet (500 mg total) by mouth every 12 (twelve) hours. for 10 days 20 tablet 0    loratadine (CLARITIN) 10 mg tablet Take 1 tablet (10 mg total) by mouth once daily. 30 tablet 0     No current facility-administered medications for this visit.       Review of patient's allergies indicates:  No Known Allergies    Objective:     Physical Exam   Constitutional: He is oriented to person, place, and time. He appears well-developed. He is cooperative.  Non-toxic appearance. He does not appear ill. No distress.   HENT:   Head: Normocephalic and atraumatic.   Ears:   Right Ear: Hearing, external ear and ear canal normal. Tympanic membrane is not erythematous and not bulging. A middle ear effusion is present. no impacted cerumen  Left Ear: Hearing, external ear and ear canal normal. Tympanic membrane is erythematous and bulging. A middle ear effusion is present. no impacted cerumen  Nose: Nose normal. No mucosal edema, rhinorrhea, nasal deformity or congestion. No epistaxis. Right sinus exhibits no maxillary sinus tenderness and no frontal sinus tenderness. Left sinus exhibits no maxillary sinus tenderness and no frontal sinus tenderness.   Mouth/Throat: Uvula is midline, oropharynx is clear and moist and mucous membranes are normal. No trismus in the jaw. Normal dentition. No uvula swelling. No oropharyngeal exudate, posterior oropharyngeal edema or posterior oropharyngeal erythema.   Eyes: Conjunctivae and lids are normal. Right eye exhibits no discharge. Left eye exhibits no discharge. No scleral icterus.   Neck: Trachea normal and phonation normal. Neck supple. No edema present. No erythema present. No neck rigidity present.   Cardiovascular: Normal rate, regular rhythm and normal heart sounds.   No murmur heard.Exam reveals no gallop and no friction  rub.   Pulmonary/Chest: Effort normal. No stridor. No respiratory distress. He has no decreased breath sounds. He has wheezes in the right upper field and the left upper field. He has no rhonchi. He has no rales.   Abdominal: Normal appearance.   Musculoskeletal: Normal range of motion.         General: No deformity. Normal range of motion.   Neurological: He is alert and oriented to person, place, and time. He exhibits normal muscle tone. Coordination normal.   Skin: Skin is warm, dry, intact, not diaphoretic and not pale.   Psychiatric: His speech is normal and behavior is normal. Judgment and thought content normal.   Nursing note and vitals reviewed.      Assessment:     1. Left otitis media with effusion    2. Viral upper respiratory illness        Plan:       Left otitis media with effusion  -     amoxicillin (AMOXIL) 500 MG Tab; Take 1 tablet (500 mg total) by mouth every 12 (twelve) hours. for 10 days  Dispense: 20 tablet; Refill: 0    Viral upper respiratory illness           I have reviewed the patient chart and pertinent past imaging/labs.  Patient states that he has not been taking his medication with the steroids because he has been worried about his ear pain.  You will restart as directed     Patient Instructions   Take the antibiotics as prescribed with food. Avoid using qtips. Use tylenol/ibuprofen as needed for pain relief/fevers. If your symptoms should worsen please return to the urgent care or ED as needed.   Continue taking previous steroids for call this will help with the wheezing that we discussed the visit today.  Continue taking cough medication as previously prescribed.  If your ear symptoms do not improve in 2-3 days please return

## 2024-07-26 NOTE — PATIENT INSTRUCTIONS
Take the antibiotics as prescribed with food. Avoid using qtips. Use tylenol/ibuprofen as needed for pain relief/fevers. If your symptoms should worsen please return to the urgent care or ED as needed.   Continue taking previous steroids for call this will help with the wheezing that we discussed the visit today.  Continue taking cough medication as previously prescribed.  If your ear symptoms do not improve in 2-3 days please return

## 2024-09-05 ENCOUNTER — OFFICE VISIT (OUTPATIENT)
Dept: URGENT CARE | Facility: CLINIC | Age: 39
End: 2024-09-05
Payer: MEDICAID

## 2024-09-05 VITALS
SYSTOLIC BLOOD PRESSURE: 148 MMHG | BODY MASS INDEX: 30.17 KG/M2 | HEART RATE: 80 BPM | OXYGEN SATURATION: 98 % | TEMPERATURE: 99 F | DIASTOLIC BLOOD PRESSURE: 67 MMHG | HEIGHT: 68 IN | WEIGHT: 199.06 LBS | RESPIRATION RATE: 19 BRPM

## 2024-09-05 DIAGNOSIS — J98.01 COUGH DUE TO BRONCHOSPASM: Primary | ICD-10-CM

## 2024-09-05 DIAGNOSIS — J30.2 SEASONAL ALLERGIES: ICD-10-CM

## 2024-09-05 DIAGNOSIS — R05.9 COUGH, UNSPECIFIED TYPE: ICD-10-CM

## 2024-09-05 LAB
CTP QC/QA: YES
SARS-COV-2 AG RESP QL IA.RAPID: NEGATIVE

## 2024-09-05 RX ORDER — ALBUTEROL SULFATE 90 UG/1
2 INHALANT RESPIRATORY (INHALATION) EVERY 6 HOURS PRN
Qty: 18 G | Refills: 0 | Status: SHIPPED | OUTPATIENT
Start: 2024-09-05

## 2024-09-05 RX ORDER — ALBUTEROL SULFATE 0.83 MG/ML
2.5 SOLUTION RESPIRATORY (INHALATION)
Status: COMPLETED | OUTPATIENT
Start: 2024-09-05 | End: 2024-09-05

## 2024-09-05 RX ORDER — BENZONATATE 200 MG/1
200 CAPSULE ORAL 3 TIMES DAILY PRN
Qty: 30 CAPSULE | Refills: 0 | Status: SHIPPED | OUTPATIENT
Start: 2024-09-05 | End: 2024-09-15

## 2024-09-05 RX ORDER — CETIRIZINE HYDROCHLORIDE 10 MG/1
10 TABLET ORAL DAILY
Qty: 30 TABLET | Refills: 0 | Status: SHIPPED | OUTPATIENT
Start: 2024-09-05 | End: 2024-10-05

## 2024-09-05 RX ADMIN — ALBUTEROL SULFATE 2.5 MG: 0.83 SOLUTION RESPIRATORY (INHALATION) at 03:09

## 2024-09-05 NOTE — PATIENT INSTRUCTIONS
Treatment  Albuterol inhaler, 2 puffs into lungs every 4-6 hours as needed to make breathing easier.  Benzonatate (tessalon) perls up to 3 times daily as needed for cough.  Zyrtec 10 mg daily in the morning for treatment of rhinorrhea, sneezing, and cough.     Care at home  To help you feel better:  Use a cool mist humidifier to avoid breathing dry air.  Use hard candy or cough drops to soothe sore throat and cough.  Gargle with salt water (mix 1/2 teaspoon salt with 1 cup warm water) a few times a day.  Spray saltwater mist in each nostril. Any normal saline spray works.  Sip warm liquids to keep your throat moist.  Take warm, steamy showers to help soothe the cough.  Do not smoke or be in smoke-filled places. Avoid things that may cause breathing problems like vaping, fumes, pollution, dust, and other common allergens.  Drink lots of water, juice, or broth to replace fluids lost in runny nose and fever.  If you have medicines to take when you are feeling short of breath, be sure to carry them with you. Then, you can take them when needed.  If you smoke, stop.  Take warm, steamy showers to help soothe the cough.  Use a cool mist humidifier. This may make it easier to breathe.  Use hard candy or cough drops to soothe sore throat and cough.  Wash your hands often. This will help prevent you from spreading germs to others.    Should you develop any worsening or new symptoms after leaving urgent care, it is recommended that you go to the ER for further/repeat evaluation.      Follow up with your PCP in 3-5 days after your urgent care visit.     Please remember that you have received care at an urgent care today. Urgent cares are not emergency rooms and are not equipped to handle life threatening emergencies and cannot rule in or out certain medical conditions and you may be released before all of your medical problems are known or treated, please schedule all follow up appointments as discussed and if you have  worsening symptoms please go to the ER to rule out potential life threatening problems, as discussed.

## 2024-09-05 NOTE — LETTER
September 5, 2024      [facility] [address]  [place] [address]  Phone: [phone]  Fax: [phone]       Patient: Yair Flynn   YOB: 1985  Date of Visit: 09/05/2024    To Whom It May Concern:    Ladi Flynn  was at [facility] on 09/05/2024. The patient may return to work/school on 9/7/2024 with no restrictions or sooner if feeling better. If you have any questions or concerns, or if I can be of further assistance, please do not hesitate to contact me.    Sincerely,      Elda Hernandez PA-C

## 2024-09-05 NOTE — PROGRESS NOTES
"Subjective:      Patient ID: Yair Flynn is a 39 y.o. male.    Vitals:  height is 5' 8" (1.727 m) and weight is 90.3 kg (199 lb 1.2 oz). His oral temperature is 98.6 °F (37 °C). His blood pressure is 148/67 (abnormal) and his pulse is 80. His respiration is 19 and oxygen saturation is 98%.     Chief Complaint: Cough    Pt comes in with Coughing, Headache, SOB, Dizziness, sx started Monday, at home tx includes tylenol gives no relief.    Provider note starts below:  Patient presents to clinic for evaluation of cough, shortness of breath, dizziness, and body aches. Symptoms onset 4 days ago. Patient states that he gets into these coughing spells multiple times per day. Cough is nonproductive. States during spells, he becomes short of breath and lightheaded. States sometimes he feels as if he is going to pass out, but denies any syncopal episodes or loss of consciousness. States his whole chest and back are sore from persistent coughing. Additionally, patient reports sneezing and runny nose. He has been taking ibuprofen at home but no other treatments tried. No known sick contacts. Pt states similar episodes have happened in the past. Most recent episode was end of July. No known lung problems. No daily medications. No hx of acid reflux. No current or former tobacco use.     Cough  This is a new problem. The current episode started in the past 7 days. The problem has been unchanged. The problem occurs constantly. The cough is Productive of sputum. Associated symptoms include myalgias, nasal congestion and shortness of breath. Pertinent negatives include no chest pain, chills, ear congestion, ear pain, fever, heartburn, hemoptysis, postnasal drip, rash, rhinorrhea, sore throat, sweats, weight loss or wheezing. Nothing aggravates the symptoms. Treatments tried: tylenol. The treatment provided mild relief. There is no history of asthma, bronchiectasis, bronchitis, COPD, emphysema, environmental allergies or " pneumonia.       Constitution: Negative for chills and fever.   HENT:  Negative for ear pain, congestion, postnasal drip and sore throat.         +rhinorrhea   Neck: Negative for neck pain and neck stiffness.   Cardiovascular:  Negative for chest pain and palpitations.   Eyes:  Negative for eye itching and eye pain.   Respiratory:  Positive for cough and shortness of breath. Negative for bloody sputum and wheezing.    Gastrointestinal:  Negative for nausea, vomiting and heartburn.   Musculoskeletal:  Positive for muscle ache. Negative for muscle cramps.   Skin:  Negative for pale and rash.   Allergic/Immunologic: Positive for sneezing. Negative for environmental allergies and itching.   Neurological:  Positive for light-headedness. Negative for dizziness, passing out, disorientation, altered mental status, loss of consciousness, numbness and tingling.   Psychiatric/Behavioral:  Negative for altered mental status, disorientation and confusion.       Objective:     Physical Exam   Constitutional: He is oriented to person, place, and time.  Non-toxic appearance. He does not appear ill. No distress.   HENT:   Head: Normocephalic and atraumatic.   Eyes: Conjunctivae are normal. Extraocular movement intact   Neck: Neck supple.   Cardiovascular: Normal rate, regular rhythm, normal heart sounds and normal pulses.   No murmur heard.Exam reveals no gallop and no friction rub.   Pulmonary/Chest: Effort normal and breath sounds normal. No stridor. No respiratory distress. He has no wheezes. He has no rhonchi. He has no rales.         Comments: Expiratory phase interrupted by persistent coughing.     Abdominal: Normal appearance.   Musculoskeletal: Normal range of motion.         General: Normal range of motion.   Neurological: He is alert, oriented to person, place, and time and at baseline.   Skin: Skin is warm and dry.   Psychiatric: His behavior is normal. Mood normal.   Nursing note and vitals reviewed.    Assessment:      Results for orders placed or performed in visit on 09/05/24   SARS Coronavirus 2 Antigen, POCT Manual Read   Result Value Ref Range    SARS Coronavirus 2 Antigen Negative Negative     Acceptable Yes        1. Cough due to bronchospasm    2. Seasonal allergies    3. Cough, unspecified type        Plan:     Cough due to bronchospasm  -     albuterol nebulizer solution 2.5 mg  -     albuterol (VENTOLIN HFA) 90 mcg/actuation inhaler; Inhale 2 puffs into the lungs every 6 (six) hours as needed for Wheezing or Shortness of Breath. Rescue  Dispense: 18 g; Refill: 0  -     benzonatate (TESSALON) 200 MG capsule; Take 1 capsule (200 mg total) by mouth 3 (three) times daily as needed for Cough.  Dispense: 30 capsule; Refill: 0    Seasonal allergies  -     cetirizine (ZYRTEC) 10 MG tablet; Take 1 tablet (10 mg total) by mouth once daily.  Dispense: 30 tablet; Refill: 0    Cough, unspecified type  -     SARS Coronavirus 2 Antigen, POCT Manual Read      Medical Decision Making:   Urgent Care Management:  Pt reports improvement of symptoms after albuterol nebulizer treatment.        Patient Instructions   Treatment  Albuterol inhaler, 2 puffs into lungs every 4-6 hours as needed to make breathing easier.  Benzonatate (tessalon) perls up to 3 times daily as needed for cough.  Zyrtec 10 mg daily in the morning for treatment of rhinorrhea, sneezing, and cough.     Care at home  To help you feel better:  Use a cool mist humidifier to avoid breathing dry air.  Use hard candy or cough drops to soothe sore throat and cough.  Gargle with salt water (mix 1/2 teaspoon salt with 1 cup warm water) a few times a day.  Spray saltwater mist in each nostril. Any normal saline spray works.  Sip warm liquids to keep your throat moist.  Take warm, steamy showers to help soothe the cough.  Do not smoke or be in smoke-filled places. Avoid things that may cause breathing problems like vaping, fumes, pollution, dust, and other common  allergens.  Drink lots of water, juice, or broth to replace fluids lost in runny nose and fever.  If you have medicines to take when you are feeling short of breath, be sure to carry them with you. Then, you can take them when needed.  If you smoke, stop.  Take warm, steamy showers to help soothe the cough.  Use a cool mist humidifier. This may make it easier to breathe.  Use hard candy or cough drops to soothe sore throat and cough.  Wash your hands often. This will help prevent you from spreading germs to others.    Should you develop any worsening or new symptoms after leaving urgent care, it is recommended that you go to the ER for further/repeat evaluation.      Follow up with your PCP in 3-5 days after your urgent care visit.     Please remember that you have received care at an urgent care today. Urgent cares are not emergency rooms and are not equipped to handle life threatening emergencies and cannot rule in or out certain medical conditions and you may be released before all of your medical problems are known or treated, please schedule all follow up appointments as discussed and if you have worsening symptoms please go to the ER to rule out potential life threatening problems, as discussed.

## 2024-10-29 ENCOUNTER — OFFICE VISIT (OUTPATIENT)
Dept: URGENT CARE | Facility: CLINIC | Age: 39
End: 2024-10-29
Payer: MEDICAID

## 2024-10-29 VITALS
RESPIRATION RATE: 17 BRPM | HEIGHT: 68 IN | SYSTOLIC BLOOD PRESSURE: 138 MMHG | HEART RATE: 78 BPM | OXYGEN SATURATION: 97 % | DIASTOLIC BLOOD PRESSURE: 87 MMHG | BODY MASS INDEX: 30.16 KG/M2 | TEMPERATURE: 98 F | WEIGHT: 199 LBS

## 2024-10-29 DIAGNOSIS — M54.16 LUMBAR RADICULOPATHY, ACUTE: Primary | ICD-10-CM

## 2024-10-29 PROCEDURE — 99213 OFFICE O/P EST LOW 20 MIN: CPT | Mod: S$GLB,,, | Performed by: PHYSICIAN ASSISTANT

## 2024-10-29 RX ORDER — CYCLOBENZAPRINE HCL 10 MG
10 TABLET ORAL 3 TIMES DAILY PRN
Qty: 30 TABLET | Refills: 0 | Status: SHIPPED | OUTPATIENT
Start: 2024-10-29 | End: 2024-11-08

## 2024-10-29 RX ORDER — PREDNISONE 10 MG/1
TABLET ORAL
Qty: 20 TABLET | Refills: 0 | Status: SHIPPED | OUTPATIENT
Start: 2024-10-29 | End: 2024-11-06

## 2024-10-29 RX ORDER — DEXAMETHASONE SODIUM PHOSPHATE 10 MG/ML
10 INJECTION INTRAMUSCULAR; INTRAVENOUS
Status: COMPLETED | OUTPATIENT
Start: 2024-10-29 | End: 2024-10-29

## 2024-10-29 RX ADMIN — DEXAMETHASONE SODIUM PHOSPHATE 10 MG: 10 INJECTION INTRAMUSCULAR; INTRAVENOUS at 09:10

## 2025-07-18 ENCOUNTER — OFFICE VISIT (OUTPATIENT)
Dept: URGENT CARE | Facility: CLINIC | Age: 40
End: 2025-07-18
Payer: OTHER MISCELLANEOUS

## 2025-07-18 VITALS
RESPIRATION RATE: 17 BRPM | BODY MASS INDEX: 33.34 KG/M2 | DIASTOLIC BLOOD PRESSURE: 69 MMHG | SYSTOLIC BLOOD PRESSURE: 108 MMHG | HEART RATE: 66 BPM | WEIGHT: 220 LBS | OXYGEN SATURATION: 97 % | HEIGHT: 68 IN

## 2025-07-18 DIAGNOSIS — S93.401A SPRAIN OF RIGHT ANKLE, UNSPECIFIED LIGAMENT, INITIAL ENCOUNTER: Primary | ICD-10-CM

## 2025-07-18 DIAGNOSIS — Z02.6 ENCOUNTER RELATED TO WORKER'S COMPENSATION CLAIM: ICD-10-CM

## 2025-07-18 LAB
CTP QC/QA: YES
POC 10 PANEL DRUG SCREEN: NEGATIVE

## 2025-07-18 RX ORDER — DICLOFENAC SODIUM 50 MG/1
50 TABLET, DELAYED RELEASE ORAL 3 TIMES DAILY PRN
Qty: 21 TABLET | Refills: 0 | Status: SHIPPED | OUTPATIENT
Start: 2025-07-18 | End: 2025-07-25

## 2025-07-18 NOTE — PROGRESS NOTES
Subjective:      Patient ID: Yair Flynn is a 40 y.o. male.    Chief Complaint: Ankle Injury    Mr. Flynn presents for evaluation of right ankle injury, DOI 7/17/25.  He is a  with [employer].  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  He was able to finish the shift, but realized he was in a lot of pain after he got home.  He elevated the ankle and iced it.  He has taken advil with some relief.  He denies any prior ankle injuries.    See MA note below.  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   What they did immediately after - He continued to work  Pain scale right now - 10/10 (told advil last night for relief)    KW      Constitution: Negative for activity change, appetite change, chills and fever.   HENT:  Negative for ear pain, ear discharge and congestion.    Eyes:  Negative for eye discharge and eye redness.   Respiratory:  Negative for cough.    Gastrointestinal:  Negative for vomiting and diarrhea.   Genitourinary:  Negative for hematuria.   Musculoskeletal:  Positive for pain, joint pain, joint swelling, pain with walking and muscle ache.   Skin:  Negative for rash and hives.   Allergic/Immunologic: Negative for hives and sneezing.   Neurological:  Negative for passing out and seizures.     Objective:     Physical Exam  Constitutional:       Appearance: Normal appearance.   HENT:      Head: Normocephalic and atraumatic.      Right Ear: External ear normal.      Left Ear: External ear normal.      Nose: Nose normal. No congestion or rhinorrhea.   Eyes:      Extraocular Movements: Extraocular movements intact.      Conjunctiva/sclera:  Conjunctivae normal.   Pulmonary:      Effort: Pulmonary effort is normal. No respiratory distress.   Musculoskeletal:         General: Normal range of motion.        Feet:       Comments: Right ankle with mild swelling to lateral aspect of ankle.  There is some ecchymosis to the lateral ankle.  There is TTP to the ATFL and proximal 5th MT.  There is FROM of the ankle with reported pain with ROM, particularly with flexion and extension.  Patient is able to bear some weight, but antalgic gait.  RLE is NVI.   Skin:     General: Skin is warm.   Neurological:      General: No focal deficit present.      Mental Status: He is alert and oriented to person, place, and time. Mental status is at baseline.   Psychiatric:         Mood and Affect: Mood normal.         Behavior: Behavior normal.         Thought Content: Thought content normal.         Judgment: Judgment normal.        XR R ankle - No acute displaced fracture or dislocation of the ankle.     XR R foot - No acute displaced fracture or dislocation of the foot.    Assessment:      1. Sprain of right ankle, unspecified ligament, initial encounter    2. Encounter related to worker's compensation claim      Plan:     I personally reviewed the XRs with no concern for acute fracture or dislocation of the foot or ankle.  RICE instructions were given.  Ankle brace was given for support.  Diclofenac TID PRN for pain.  He would like to get back to regular duty as soon as possible.  I will see him on Monday and re assess.      Diagnoses and plan discussed with the patient, as well as the expected course and duration of his symptoms.  All questions and concerns were addressed prior to discharge.  He was advised to follow up in clinic, as scheduled.  He may follow up in clinic sooner, if needed.  Emergency department precautions were given.  Patient verbalized understanding and was happy with the plan of care.   Note dictated with voice recognition software, please excuse any  grammatical errors.    Medications Ordered This Encounter   Medications    diclofenac (VOLTAREN) 50 MG EC tablet     Sig: Take 1 tablet (50 mg total) by mouth 3 (three) times daily as needed (pain).     Dispense:  21 tablet     Refill:  0     Patient Instructions: Attention not to aggravate affected area (Ice ankle every 2-3 hours for 15 minutes at a time.  Keep elevated and rest.  Use ankle brace when ambulating.)   Restrictions: Sit down work only  Follow up in about 3 days (around 7/21/2025).

## 2025-07-18 NOTE — LETTER
[facility] - [place] [address]  Phone: [phone]  Fax: [phone]  [facility]: [id number]    Pt Name: Yair Flynn  Injury Date: 07/17/2025   Employee ID: [id number] Date of First Treatment: 07/18/2025   Company: [employer]      Appointment Time: 12:15 PM Arrived: 12:33 PM   Provider: Sruthi Osuna PA-C Time Out:2:30 PM     Office Treatment:   1. Sprain of right ankle, unspecified ligament, initial encounter    2. Encounter related to worker's compensation claim      Medications Ordered This Encounter   Medications    diclofenac (VOLTAREN) 50 MG EC tablet          Patient Instructions: Attention not to aggravate affected area (Ice ankle every 2-3 hours for 15 minutes at a time.  Keep elevated and rest.  Use ankle brace when ambulating.)        Restrictions: Sit down work only     Return Appointment: 7/21/2025 at 11:30 AM    KW

## 2025-07-21 ENCOUNTER — OFFICE VISIT (OUTPATIENT)
Dept: URGENT CARE | Facility: CLINIC | Age: 40
End: 2025-07-21
Payer: OTHER MISCELLANEOUS

## 2025-07-21 VITALS
OXYGEN SATURATION: 98 % | SYSTOLIC BLOOD PRESSURE: 111 MMHG | HEART RATE: 65 BPM | HEIGHT: 68 IN | BODY MASS INDEX: 33.34 KG/M2 | RESPIRATION RATE: 17 BRPM | DIASTOLIC BLOOD PRESSURE: 68 MMHG | WEIGHT: 220 LBS

## 2025-07-21 DIAGNOSIS — Z02.6 ENCOUNTER RELATED TO WORKER'S COMPENSATION CLAIM: ICD-10-CM

## 2025-07-21 DIAGNOSIS — S93.491A SPRAIN OF ANTERIOR TALOFIBULAR LIGAMENT OF RIGHT ANKLE, INITIAL ENCOUNTER: Primary | ICD-10-CM

## 2025-07-21 PROCEDURE — 99213 OFFICE O/P EST LOW 20 MIN: CPT | Mod: S$GLB,,, | Performed by: PHYSICIAN ASSISTANT

## 2025-07-21 NOTE — LETTER
[facility] - [place] [address]  Phone: [phone]  Fax: [phone]  [facility]: [id number]    Pt Name: Yair Flynn  Injury Date: 07/17/2025   Employee ID: [id number] Date of Treatment: 07/21/2025   Company: [employer]      Appointment Time: 11:15 AM Arrived: 11:23 am   Provider: Sruthi Osuna PA-C Time Out:12:03 pm     Office Treatment:   1. Sprain of anterior talofibular ligament of right ankle, initial encounter    2. Encounter related to worker's compensation claim          Patient Instructions: Attention not to aggravate affected area, Daily home exercises/warm soaks        Restrictions: Sedentary work only (Wear ankle brace with walking.)     Return Appointment: 7/24/2025 at 11:30 am    KW

## 2025-07-21 NOTE — PROGRESS NOTES
Subjective:      Patient ID: Yair Flynn is a 40 y.o. male.    Chief Complaint: Ankle Injury    Mr. Flynn presents for follow up of right ankle sprain, DOI 7/17/25.  He is a  for [employer].  He reports his ankle pain is improving.  He is still having pain in the ankle after standing/walking for more than 2 hours.  He is icing, elevating, and doing ROM exercises.  He has been taking the diclofenac with relief.  His work has not accommodated his restrictions.    See MA note below.  Patient's place of employment - [employer]  Patient's job title -   Date of injury - 7/17/25  Body part injured - right ankle  Current work status per last visit - light duty  Improved, same, or worse - improving  Pain Scale right now (1-10) -  2/10    KW        Constitution: Negative for activity change, appetite change, chills and fever.   HENT:  Negative for ear pain, ear discharge and congestion.    Eyes:  Negative for eye discharge and eye redness.   Respiratory:  Negative for cough.    Gastrointestinal:  Negative for vomiting and diarrhea.   Genitourinary:  Negative for hematuria.   Musculoskeletal:  Positive for pain, joint pain, joint swelling, pain with walking and muscle ache.   Skin:  Negative for rash and hives.   Allergic/Immunologic: Negative for hives and sneezing.   Neurological:  Negative for passing out and seizures.     Objective:     Physical Exam  Constitutional:       Appearance: Normal appearance.   HENT:      Head: Normocephalic and atraumatic.      Right Ear: External ear normal.      Left Ear: External ear normal.      Nose: Nose normal. No congestion or rhinorrhea.   Eyes:      Extraocular Movements: Extraocular movements intact.      Conjunctiva/sclera: Conjunctivae normal.   Pulmonary:      Effort: Pulmonary effort is normal. No respiratory distress.   Musculoskeletal:         General: Normal range of motion.        Feet:       Comments: Right ankle with mild  swelling to lateral aspect of ankle, no ecchymosis  There is TTP to the ATFL and proximal 5th MT.  There is FROM of the ankle with pain with inversion.  Patient is able to walk with ankle brace.  RLE is NVI.   Skin:     General: Skin is warm.   Neurological:      General: No focal deficit present.      Mental Status: He is alert and oriented to person, place, and time. Mental status is at baseline.   Psychiatric:         Mood and Affect: Mood normal.         Behavior: Behavior normal.         Thought Content: Thought content normal.         Judgment: Judgment normal.        Assessment:      1. Sprain of anterior talofibular ligament of right ankle, initial encounter    2. Encounter related to worker's compensation claim      Plan:     Mr. Flynn is making improvements, but still cannot ambulate on his ankle for extended periods of time.  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  He may work in a sedentary position, but no prolonged standing/walking at this time.  He will continue RICE and ROM exercises.  I will have him follow up at the end of the week for re evaluation.    Diagnoses and plan discussed with the patient, as well as the expected course and duration of his symptoms.  All questions and concerns were addressed prior to discharge.  He was advised to follow up in clinic, as scheduled.  He may follow up in clinic sooner, if needed.  Emergency department precautions were given.  Patient verbalized understanding and was happy with the plan of care.   Note dictated with voice recognition software, please excuse any grammatical errors.       Patient Instructions: Attention not to aggravate affected area, Daily home exercises/warm soaks   Restrictions: Sedentary work only (Wear ankle brace with walking.)  Follow up in about 3 days (around 7/24/2025).

## 2025-07-24 ENCOUNTER — OFFICE VISIT (OUTPATIENT)
Dept: URGENT CARE | Facility: CLINIC | Age: 40
End: 2025-07-24
Payer: OTHER MISCELLANEOUS

## 2025-07-24 VITALS
HEART RATE: 74 BPM | DIASTOLIC BLOOD PRESSURE: 74 MMHG | SYSTOLIC BLOOD PRESSURE: 115 MMHG | BODY MASS INDEX: 33.34 KG/M2 | RESPIRATION RATE: 17 BRPM | WEIGHT: 220 LBS | OXYGEN SATURATION: 98 % | HEIGHT: 68 IN

## 2025-07-24 DIAGNOSIS — Z02.6 ENCOUNTER RELATED TO WORKER'S COMPENSATION CLAIM: ICD-10-CM

## 2025-07-24 DIAGNOSIS — S93.491A SPRAIN OF ANTERIOR TALOFIBULAR LIGAMENT OF RIGHT ANKLE, INITIAL ENCOUNTER: Primary | ICD-10-CM

## 2025-07-24 NOTE — LETTER
[facility] - [place] [address]  Phone: [phone]  Fax: [phone]  [facility]: [id number]    Pt Name: Yair Flynn  Injury Date: 07/17/2025   Employee ID: [id number] Date of Treatment: 07/24/2025   Company: [employer]      Appointment Time: 11:15 AM Arrived: 11:35  AM   Provider: Sruthi Osuna PA-C Time Out:12:15 PM     Office Treatment:   1. Sprain of anterior talofibular ligament of right ankle, initial encounter    2. Encounter related to worker's compensation claim          Patient Instructions: Attention not to aggravate affected area        Restrictions: Regular Duty (Continue light duty until 7/28/25.  May return to regular duty on Monday, 7/28/25.)     Return Appointment: 7/17/25 at 11:30 am    KW

## 2025-07-24 NOTE — LETTER
[facility] - [place] [address]  Phone: [phone]  Fax: [phone]  [facility]: [id number]    Pt Name: Yair Flynn  Injury Date: 07/17/2025   Employee ID: [id number] Date of Treatment: 07/24/2025   Company: [employer]      Appointment Time: 11:15 AM Arrived: 11:35 AM   Provider: Sruthi Osuna PA-C Time Out:12:15 PM     Office Treatment:   1. Sprain of anterior talofibular ligament of right ankle, initial encounter    2. Encounter related to worker's compensation claim          Patient Instructions: Attention not to aggravate affected area      Restrictions: Regular Duty (Continue light duty until 7/28/25.  May return to regular duty on Monday, 7/28/25.)     Return Appointment: 8/12/2025 at 11:30 AM at [address] [place] [address]    KW

## 2025-07-24 NOTE — PROGRESS NOTES
Subjective:      Patient ID: Yair Flynn is a 40 y.o. male.    Chief Complaint: Ankle Injury    Mr. Flynn presents for follow up of right ankle sprain, DOI 7/17/25.  He is a  for [employer].  He reports continued improvement in the ankle pain.  He is able to walk and stand for longer periods of time every day.  He is icing, elevating, and doing ROM exercises.  He is no longer taking diclofenac and is taking tylenol PRN. His work has not accommodated his restrictions.    See MA note below.  Patient's place of employment - [employer]  Patient's job title -   Date of injury - 7/17/25  Body part injured - right ankle  Current work status per last visit - light duty  Improved, same, or worse - improving  Pain Scale right now (1-10) -  1/10 with some discomfort    KW        Constitution: Negative for activity change, appetite change, chills and fever.   HENT:  Negative for ear pain, ear discharge and congestion.    Eyes:  Negative for eye discharge and eye redness.   Respiratory:  Negative for cough.    Gastrointestinal:  Negative for vomiting and diarrhea.   Genitourinary:  Negative for hematuria.   Musculoskeletal:  Positive for pain, joint pain, joint swelling, pain with walking and muscle ache.   Skin:  Negative for rash and hives.   Allergic/Immunologic: Negative for hives and sneezing.   Neurological:  Negative for passing out and seizures.     Objective:     Physical Exam  Constitutional:       Appearance: Normal appearance.   HENT:      Head: Normocephalic and atraumatic.      Right Ear: External ear normal.      Left Ear: External ear normal.      Nose: Nose normal. No congestion or rhinorrhea.   Eyes:      Extraocular Movements: Extraocular movements intact.      Conjunctiva/sclera: Conjunctivae normal.   Pulmonary:      Effort: Pulmonary effort is normal. No respiratory distress.   Musculoskeletal:         General: Normal range of motion.        Feet:        Comments: Right ankle with mild swelling to lateral aspect of ankle, no ecchymosis  There is TTP to the ATFL, improved.  No TTP proximal 5th MT.  There is FROM of the ankle with pain with inversion.  Patient is able to walk with ankle brace.  RLE is NVI.   Skin:     General: Skin is warm.   Neurological:      General: No focal deficit present.      Mental Status: He is alert and oriented to person, place, and time. Mental status is at baseline.   Psychiatric:         Mood and Affect: Mood normal.         Behavior: Behavior normal.         Thought Content: Thought content normal.         Judgment: Judgment normal.        Assessment:      1. Sprain of anterior talofibular ligament of right ankle, initial encounter    2. Encounter related to worker's compensation claim      Plan:     Mr. Flynn is doing well with continued improvement in ankle pain.  I will return him to regular duty at work, starting Monday, 7/28.  He will wear the ankle brace at work.  I will have him follow up in 2 weeks and anticipate discharge if he continues to improve.    Diagnoses and plan discussed with the patient, as well as the expected course and duration of his symptoms.  All questions and concerns were addressed prior to discharge.  He was advised to follow up in clinic, as scheduled.  He may follow up in clinic sooner, if needed.  Emergency department precautions were given.  Patient verbalized understanding and was happy with the plan of care.   Note dictated with voice recognition software, please excuse any grammatical errors.       Patient Instructions: Attention not to aggravate affected area   Restrictions: Regular Duty (Continue light duty until 7/28/25.  May return to regular duty on Monday, 7/28/25.)  Follow up in about 19 days (around 8/12/2025).

## 2025-08-13 ENCOUNTER — TELEPHONE (OUTPATIENT)
Dept: URGENT CARE | Facility: CLINIC | Age: 40
End: 2025-08-13
Payer: MEDICAID